# Patient Record
Sex: FEMALE | Race: WHITE | Employment: FULL TIME | URBAN - METROPOLITAN AREA
[De-identification: names, ages, dates, MRNs, and addresses within clinical notes are randomized per-mention and may not be internally consistent; named-entity substitution may affect disease eponyms.]

---

## 2017-02-27 ENCOUNTER — TELEPHONE (OUTPATIENT)
Dept: INTERNAL MEDICINE CLINIC | Age: 22
End: 2017-02-27

## 2017-02-27 NOTE — TELEPHONE ENCOUNTER
Patient stated she went to  on last Wednesday for Chest pain and SOB but they didn't find anything.  told her that she had a panic attack and gave her ativan. Patient stated she went to the ER on Saturday and was told she had Pleurisy. Patient has a lots of questions. Patient wants to know how she got pleurisy. Patient would like to know Dr. RIVERO [facility] recommendation. Patient given an opportunity to ask questions, repeated information, and verbalized understanding.

## 2017-02-27 NOTE — TELEPHONE ENCOUNTER
Pt called and states that she is needing a call back in regards to her diagnosis from the ER on 2/24/17. Please call pt.

## 2017-02-28 NOTE — TELEPHONE ENCOUNTER
Spoke with patient after verifying name and  regarding Dr. David Sweeney recommendations. Writer informed patient of Dr. David Sweeney recommendations. Patient given an opportunity to ask questions, repeated information, and verbalized understanding.

## 2017-03-08 ENCOUNTER — TELEPHONE (OUTPATIENT)
Dept: INTERNAL MEDICINE CLINIC | Age: 22
End: 2017-03-08

## 2017-03-08 NOTE — TELEPHONE ENCOUNTER
Pt called and states that she is needing a call back in regards to getting a medication called in for her coughing up green mucus. Pt states that she was in the ER on 3/3/17. Please call pt.

## 2017-03-30 ENCOUNTER — OFFICE VISIT (OUTPATIENT)
Dept: INTERNAL MEDICINE CLINIC | Age: 22
End: 2017-03-30

## 2017-03-30 VITALS
HEIGHT: 67 IN | OXYGEN SATURATION: 98 % | WEIGHT: 162.4 LBS | TEMPERATURE: 97.7 F | BODY MASS INDEX: 25.49 KG/M2 | HEART RATE: 82 BPM | DIASTOLIC BLOOD PRESSURE: 75 MMHG | SYSTOLIC BLOOD PRESSURE: 108 MMHG | RESPIRATION RATE: 18 BRPM

## 2017-03-30 DIAGNOSIS — J06.9 URI, ACUTE: Primary | ICD-10-CM

## 2017-03-30 RX ORDER — AMOXICILLIN AND CLAVULANATE POTASSIUM 875; 125 MG/1; MG/1
1 TABLET, FILM COATED ORAL 2 TIMES DAILY
Qty: 20 TAB | Refills: 0 | Status: SHIPPED | OUTPATIENT
Start: 2017-03-30 | End: 2017-06-09

## 2017-03-30 NOTE — PROGRESS NOTES
Reviewed record in preparation for visit and have obtained necessary documentation. Identified pt with two pt identifiers(name and ). Chief Complaint   Patient presents with    Cold Symptoms     c/o of cough with green mucus, sneezing, fatigue denies fever or body aches seen in ED for pleurisy on 17           Coordination of Care Questionnaire:  :     1) Have you been to an emergency room, urgent care clinic since your last visit?  ED [facility] 17  Hospitalized since your last visit? no             2) Have you seen or consulted any other health care providers outside of [facility] since your last visit? no  (Include any pap smears or colon screenings in this section.)

## 2017-03-30 NOTE — PROGRESS NOTES
Sheyla Villalta is a [place] y.o. female who complains of being sick for a month. Had pleurisy. Developed URI. Took zpak. Got better. For the past 5 days, nasal congestion, ear pain. No sinus or ear pain  Headaches. Notes discolored mucous with blowing nose and coughing. Notes wheezing and chest congestion. No fever. Some sweats and malaise. Prior bronchitis. Taking mucinex for congestion. Relevant PMH: No pertinent additional PMH. Review of Systems  Pertinent items are noted in HPI. Objective:     Visit Vitals    /75 (BP 1 Location: Left arm, BP Patient Position: Sitting)    Pulse 82    Temp 97.7 °F (36.5 °C) (Oral)    Resp 18    Ht 5' 7.2\" (1.707 m)    Wt 162 lb 6.4 oz (73.7 kg)    LMP 03/30/2017    SpO2 98%    BMI 25.28 kg/m2     Gen: Mildly ill appearing female  HEENT:   PERRL,normal conjunctiva. External ear and canals normal, TMs no opacification or erythema,  Swollen nasal turbinates, no sinus pain on palpation,  OP no erythema, no exudates, MMM  Neck:  Supple. Thyroid normal size, nontender, without nodules. No masses or LAD  Resp:  No wheezing, no rhonchi, no rales. CV:  RRR, normal S1S2, no murmur. Assessment/Plan:       ICD-10-CM ICD-9-CM    1. URI, acute J06.9 465.9 amoxicillin-clavulanate (AUGMENTIN) 875-125 mg per tablet   antibiotics for early sinusitis, recommend mucinex. Follow-up Disposition:  Return if symptoms worsen or fail to improve.     William Lala MD

## 2017-03-30 NOTE — MR AVS SNAPSHOT
Visit Information Date & Time Provider Department Dept. Phone Encounter #  
 3/30/2017 12:00 PM Rolando Salmeron, [address] [place] [address],[address] [phone] [id number] Follow-up Instructions Return if symptoms worsen or fail to improve. Upcoming Health Maintenance Date Due  
 HPV AGE 9Y-34Y (1 of 3 - Female 3 Dose Series) 7/22/2004 DTaP/Tdap/Td series (1 - Tdap) 7/22/2014 INFLUENZA AGE 9 TO ADULT 8/1/2016 PAP AKA CERVICAL CYTOLOGY 12/31/2018 Allergies as of 3/30/2017  Review Complete On: 10/26/2016 By: Rolando Salmeron MD  
  
 Severity Noted Reaction Type Reactions Hydrocodone  12/31/2015    Nausea and Vomiting Current Immunizations  Never Reviewed No immunizations on file. Not reviewed this visit You Were Diagnosed With   
  
 Codes Comments URI, acute    -  Primary ICD-10-CM: J06.9 ICD-9-CM: 465.9 Vitals BP Pulse Temp Resp Height(growth percentile) Weight(growth percentile) 108/75 (BP 1 Location: Left arm, BP Patient Position: Sitting) 82 97.7 °F (36.5 °C) (Oral) 18 5' 7.2\" (1.707 m) 162 lb 6.4 oz (73.7 kg) LMP SpO2 BMI OB Status Smoking Status 03/30/2017 98% 25.28 kg/m2 Having regular periods Never Smoker Vitals History BMI and BSA Data Body Mass Index Body Surface Area  
 25.28 kg/m 2 1.87 m 2 Preferred Pharmacy Pharmacy Name Phone [id number]- [facility] [address] [facility] 1 [phone] Your Updated Medication List  
  
   
This list is accurate as of: 3/30/17 12:49 PM.  Always use your most recent med list.  
  
  
  
  
 amoxicillin-clavulanate 875-125 mg per tablet Commonly known as:  AUGMENTIN Take 1 Tab by mouth two (2) times a day. escitalopram oxalate 10 mg tablet Commonly known as:  Arther Putty Take 1 Tab by mouth daily. multivitamin tablet Commonly known as:  ONE A DAY Take 1 Tab by mouth daily. norelgestromin-ethinyl estradiol 150-35 mcg/24 hr  
Commonly known as:  ORTHO EVRA  
1 Patch by TransDERmal route Every Saturday. SUMAtriptan 100 mg tablet Commonly known as:  IMITREX Take 1/2-1 tablets at onset of headache, repeat at 2 hours if needed, max 200mg in 24 hours. Prescriptions Sent to Pharmacy Refills  
 amoxicillin-clavulanate (AUGMENTIN) 875-125 mg per tablet 0 Sig: Take 1 Tab by mouth two (2) times a day. Class: Normal  
 Pharmacy: [address] [place] [address] 1  #: [phone] Route: Oral  
  
Follow-up Instructions Return if symptoms worsen or fail to improve. Introducing \[facility]\"" & HEALTH SERVICES! Alejandra Ashley introduces [employer] patient portal. Now you can access parts of your medical record, email your doctor's office, and request medication refills online. 1. In your internet browser, go to [email or web address] 2. Click on the First Time User? Click Here link in the Sign In box. You will see the New Member Sign Up page. 3. Enter your [employer] Access Code exactly as it appears below. You will not need to use this code after youve completed the sign-up process. If you do not sign up before the expiration date, you must request a new code. · [employer] Access Code: [email or web address] Expires: 6/28/2017 12:49 PM 
 
4. Enter the last four digits of your Social Security Number (xxxx) and Date of Birth (mm/dd/yyyy) as indicated and click Submit. You will be taken to the next sign-up page. 5. Create a [employer] ID. This will be your [employer] login ID and cannot be changed, so think of one that is secure and easy to remember. 6. Create a [employer] password. You can change your password at any time. 7. Enter your Password Reset Question and Answer. This can be used at a later time if you forget your password. 8. Enter your e-mail address.  You will receive e-mail notification when new information is available in [employer]. 9. Click Sign Up. You can now view and download portions of your medical record. 10. Click the Download Summary menu link to download a portable copy of your medical information. If you have questions, please visit the Frequently Asked Questions section of the [employer] website. Remember, [employer] is NOT to be used for urgent needs. For medical emergencies, dial 911. Now available from your iPhone and Android! Please provide this summary of care documentation to your next provider. Your primary care clinician is listed as Owen Marie. If you have any questions after today's visit, please call [phone].

## 2017-06-09 ENCOUNTER — OFFICE VISIT (OUTPATIENT)
Dept: INTERNAL MEDICINE CLINIC | Age: 22
End: 2017-06-09

## 2017-06-09 VITALS
RESPIRATION RATE: 16 BRPM | TEMPERATURE: 98.5 F | SYSTOLIC BLOOD PRESSURE: 104 MMHG | BODY MASS INDEX: 25.46 KG/M2 | HEIGHT: 67 IN | WEIGHT: 162.2 LBS | OXYGEN SATURATION: 96 % | DIASTOLIC BLOOD PRESSURE: 70 MMHG | HEART RATE: 85 BPM

## 2017-06-09 DIAGNOSIS — Z12.4 SCREENING FOR CERVICAL CANCER: Primary | ICD-10-CM

## 2017-06-09 DIAGNOSIS — F41.9 ANXIETY: ICD-10-CM

## 2017-06-09 RX ORDER — ESCITALOPRAM OXALATE 10 MG/1
10 TABLET ORAL DAILY
Qty: 90 TAB | Refills: 3 | Status: SHIPPED | OUTPATIENT
Start: 2017-06-09 | End: 2020-01-24

## 2017-06-09 NOTE — PROGRESS NOTES
Phillip Hassan is a 19 y.o. female    Chief Complaint   Patient presents with    Well Woman     W/h pap    Medication Refill     Lexapro     1. Have you been to the ER, urgent care clinic since your last visit? Hospitalized since your last visit? No    2. Have you seen or consulted any other health care providers outside of the [address] [place] [address] since your last visit? Include any pap smears or colon screening.  No

## 2017-06-09 NOTE — MR AVS SNAPSHOT
Visit Information Date & Time Provider Department Dept. Phone Encounter #  
 6/9/2017  9:30 AM Yaquelin Del Valle, [address] [id number] Follow-up Instructions Return for follow up annual and as needed. Khanh Carrero Upcoming Health Maintenance Date Due  
 HPV AGE 9Y-34Y (1 of 3 - Female 3 Dose Series) 7/22/2004 DTaP/Tdap/Td series (1 - Tdap) 7/22/2014 INFLUENZA AGE 9 TO ADULT 8/1/2017 PAP AKA CERVICAL CYTOLOGY 12/31/2018 Allergies as of 6/9/2017  Review Complete On: 6/9/2017 By: Yaquelin Del Valle MD  
  
 Severity Noted Reaction Type Reactions Hydrocodone  12/31/2015    Nausea and Vomiting Current Immunizations  Never Reviewed No immunizations on file. Not reviewed this visit You Were Diagnosed With   
  
 Codes Comments Screening for cervical cancer    -  Primary ICD-10-CM: Z12.4 ICD-9-CM: V76.2 Anxiety     ICD-10-CM: F41.9 ICD-9-CM: 300.00 Vitals BP Pulse Temp Resp Height(growth percentile) Weight(growth percentile) 104/70 (BP 1 Location: Left arm, BP Patient Position: Sitting) 85 98.5 °F (36.9 °C) (Oral) 16 5' 7\" (1.702 m) 162 lb 3.2 oz (73.6 kg) LMP SpO2 BMI OB Status Smoking Status 05/26/2017 (Approximate) 96% 25.4 kg/m2 Having regular periods Never Smoker Vitals History BMI and BSA Data Body Mass Index Body Surface Area  
 25.4 kg/m 2 1.87 m 2 Preferred Pharmacy Pharmacy Name Phone [id number] [place], [address] [facility] 2 [phone] Your Updated Medication List  
  
   
This list is accurate as of: 6/9/17 10:17 AM.  Always use your most recent med list.  
  
  
  
  
 escitalopram oxalate 10 mg tablet Commonly known as:  Aliene Apo Take 1 Tab by mouth daily. multivitamin tablet Commonly known as:  ONE A DAY Take 1 Tab by mouth daily.   
  
 norelgestromin-ethinyl estradiol 150-35 mcg/24 hr  
 Commonly known as:  ORTHO EVRA  
1 Patch by TransDERmal route Every Saturday. SUMAtriptan 100 mg tablet Commonly known as:  IMITREX Take 1/2-1 tablets at onset of headache, repeat at 2 hours if needed, max 200mg in 24 hours. Prescriptions Sent to Pharmacy Refills  
 escitalopram oxalate (LEXAPRO) 10 mg tablet 3 Sig: Take 1 Tab by mouth daily. Class: Normal  
 Pharmacy: [address] [place] [address] 1  #: [phone] Route: Oral  
  
We Performed the Following PAP (IMAGE GUIDED) W/REFL HPV ALL PATHOLOGY ([id number]) [TSK9600 Custom] Follow-up Instructions Return for follow up annual and as needed. .  
  
  
Introducing [facility] & HEALTH SERVICES! Skip Marquez introduces [employer] patient portal. Now you can access parts of your medical record, email your doctor's office, and request medication refills online. 1. In your internet browser, go to [email or web address] 2. Click on the First Time User? Click Here link in the Sign In box. You will see the New Member Sign Up page. 3. Enter your [employer] Access Code exactly as it appears below. You will not need to use this code after youve completed the sign-up process. If you do not sign up before the expiration date, you must request a new code. · [employer] Access Code: [email or web address] Expires: 6/28/2017 12:49 PM 
 
4. Enter the last four digits of your Social Security Number (xxxx) and Date of Birth (mm/dd/yyyy) as indicated and click Submit. You will be taken to the next sign-up page. 5. Create a [employer] ID. This will be your [employer] login ID and cannot be changed, so think of one that is secure and easy to remember. 6. Create a [employer] password. You can change your password at any time. 7. Enter your Password Reset Question and Answer. This can be used at a later time if you forget your password. 8. Enter your e-mail address. You will receive e-mail notification when new information is available in [address] 9. Click Sign Up. You can now view and download portions of your medical record. 10. Click the Download Summary menu link to download a portable copy of your medical information. If you have questions, please visit the Frequently Asked Questions section of the [employer] website. Remember, [employer] is NOT to be used for urgent needs. For medical emergencies, dial 911. Now available from your iPhone and Android! Please provide this summary of care documentation to your next provider. Your primary care clinician is listed as Dinesh Echevarria. If you have any questions after today's visit, please call [phone].

## 2017-06-09 NOTE — PROGRESS NOTES
She is a 19 y.o. female who presents for well woman exam.      Last pap in January 2016 was normal and negative for HPV. Had abnormal pap in 2015. Diagnosed HPV positive by pap in May 2015 with last PCP. Is on Ortho Evra patch. Sexually active, Monogamous. No vaginal discharge or lesions. No urinary sx. Had Gardisil series. Taking lexapro for mood. Anxiety better controlled. Taking imitrex as needed for migraines with good efficacy    Due for eye exam.  Up to date on dental.       ROS:  Constitutional: negative for fevers, chills, anorexia and weight loss  Eyes:   negative for visual disturbance and irritation  ENT:   negative for tinnitus,sore throat,nasal congestion,ear pain. Respiratory:  negative for cough, hemoptysis, dyspnea,wheezing  CV:   negative for chest pain, palpitations, lower extremity edema  GI:   negative for nausea, vomiting, diarrhea, abdominal pain,melena  Genitourinary: negative for frequency, dysuria and hematuria, vaginal discharge, lesions  Musculoskel: negative for myalgias, arthralgias, back pain, muscle weakness, joint pain  Neurological:  negative for headaches, dizziness, focal weakness, numbness  Psych:             Positive for anxiety      Past Medical History:   Diagnosis Date    HPV test positive     Lyme disease 2009    no residual symptoms       History reviewed. No pertinent surgical history. Family History   Problem Relation Age of Onset    Diabetes Mother     Hypertension Mother     High Cholesterol Father        Social History     Social History    Marital status: SINGLE     Spouse name: N/A    Number of children: N/A    Years of education: N/A     Occupational History    Not on file.      Social History Main Topics    Smoking status: Never Smoker    Smokeless tobacco: Not on file    Alcohol use Yes    Drug use: No    Sexual activity: Yes     Partners: Male     Other Topics Concern    Not on file     Social History Narrative            Visit Vitals    /70 (BP 1 Location: Left arm, BP Patient Position: Sitting)    Pulse 85    Temp 98.5 °F (36.9 °C) (Oral)    Resp 16    Ht 5' 7\" (1.702 m)    Wt 162 lb 3.2 oz (73.6 kg)    LMP 05/26/2017 (Approximate)    SpO2 96%    BMI 25.4 kg/m2       Physical Examination:   General - Well appearing female  HEENT - unremarkable  Neck - supple, no bruits, no TMG, no LAD  Pulm - clear to auscultation bilaterally  Cardio - RRR, normal S1 S2, no murmur  Abd - soft, nontender, no masses, no HSM  Extrem - no edema, +2 distal pulses  Breasts- normal nipples without discharge or rash, no masses, no axillary LAD  Pelvic- normal external genitalia, speculum exam with normal appearing cervix, pap   done, bimanual exam with no CMT, adnexal tenderness or masses      Assessement and Plan:     1. Screening for cervical cancer    - PAP (IMAGE GUIDED) W/REFL HPV ALL PATHOLOGY ([id number])    2. Anxiety    - escitalopram oxalate (LEXAPRO) 10 mg tablet; Take 1 Tab by mouth daily. Dispense: 90 Tab; Refill: 3     Follow-up Disposition:  Return for follow up annual and as needed.   Izabela Finley MD

## 2017-06-22 ENCOUNTER — TELEPHONE (OUTPATIENT)
Dept: INTERNAL MEDICINE CLINIC | Age: 22
End: 2017-06-22

## 2017-06-22 NOTE — TELEPHONE ENCOUNTER
Returned call to patient. Left detailed message on patient's personal voicemail advising pap results not back yet. Will call when results available.

## 2017-06-29 ENCOUNTER — TELEPHONE (OUTPATIENT)
Dept: INTERNAL MEDICINE CLINIC | Age: 22
End: 2017-06-29

## 2017-06-29 NOTE — TELEPHONE ENCOUNTER
Patient states she would like a call back between 1-1:30pm to get her Pap Smear results. Please call.  Thank you

## 2017-06-29 NOTE — TELEPHONE ENCOUNTER
Returned call and left message for patient stating we were calling [facility] now to track down results. Will call back ASAP with results.

## 2017-07-26 NOTE — TELEPHONE ENCOUNTER
Spoke with Radha Sahu at [facility]. They do not have patient's pap results. Patient has [employer]. Therefore, her results are through [employer].

## 2017-07-26 NOTE — TELEPHONE ENCOUNTER
Patient states she needs a call back today in reference to test/lab results that patient states she's still waiting to hear about from June. Please call to advise.  Thank you

## 2017-07-26 NOTE — TELEPHONE ENCOUNTER
Results received. Called patient and advised of normal pap. Negative for intraepithelial lesion or malignancy. Patient verbalized understanding. Patient reported hx of abnml pap about 2 years ago. Normal paps since. Based on current ACOG guidelines, advised patient she could repeat pap in 3 years. She opts to have pap annually. All questions answered today. Also apologized for delay in results. Patient appreciative.

## 2017-07-26 NOTE — TELEPHONE ENCOUNTER
Spoke with Giuseppe Oliver at [facility]. She has patient's pap results and is faxing them to me directly at [phone].

## 2017-09-25 DIAGNOSIS — G43.909 MIGRAINE WITHOUT STATUS MIGRAINOSUS, NOT INTRACTABLE, UNSPECIFIED MIGRAINE TYPE: ICD-10-CM

## 2017-09-26 RX ORDER — SUMATRIPTAN 100 MG/1
TABLET, FILM COATED ORAL
Qty: 9 TAB | Refills: 5 | Status: SHIPPED | OUTPATIENT
Start: 2017-09-26 | End: 2021-01-08 | Stop reason: SDUPTHER

## 2017-12-19 RX ORDER — NORELGESTROMIN AND ETHINYL ESTRADIOL 150; 35 UG/D; UG/D
PATCH TRANSDERMAL
Qty: 12 PATCH | Refills: 0 | Status: SHIPPED | OUTPATIENT
Start: 2017-12-19 | End: 2018-03-17 | Stop reason: SDUPTHER

## 2018-03-18 RX ORDER — NORELGESTROMIN AND ETHINYL ESTRADIOL 150; 35 UG/D; UG/D
PATCH TRANSDERMAL
Qty: 12 PATCH | Refills: 0 | Status: SHIPPED | OUTPATIENT
Start: 2018-03-18 | End: 2018-07-03 | Stop reason: SDUPTHER

## 2018-07-03 RX ORDER — NORELGESTROMIN AND ETHINYL ESTRADIOL 150; 35 UG/D; UG/D
PATCH TRANSDERMAL
Qty: 12 PATCH | Refills: 0 | Status: SHIPPED | OUTPATIENT
Start: 2018-07-03 | End: 2020-01-24 | Stop reason: ALTCHOICE

## 2018-07-09 ENCOUNTER — HOSPITAL ENCOUNTER (EMERGENCY)
Age: 23
Discharge: HOME | End: 2018-07-09
Attending: EMERGENCY MEDICINE
Payer: COMMERCIAL

## 2018-07-09 VITALS
TEMPERATURE: 99.1 F | DIASTOLIC BLOOD PRESSURE: 54 MMHG | RESPIRATION RATE: 15 BRPM | HEIGHT: 67 IN | HEART RATE: 99 BPM | OXYGEN SATURATION: 99 % | WEIGHT: 160 LBS | BODY MASS INDEX: 25.11 KG/M2 | SYSTOLIC BLOOD PRESSURE: 116 MMHG

## 2018-07-09 DIAGNOSIS — K52.9 GASTROENTERITIS, ACUTE: Primary | ICD-10-CM

## 2018-07-09 LAB
ALBUMIN SERPL-MCNC: 4 G/DL (ref 3.5–5)
ALBUMIN/GLOB SERPL: 0.9 {RATIO} (ref 1.1–2.2)
ALP SERPL-CCNC: 76 U/L (ref 45–117)
ALT SERPL-CCNC: 21 U/L (ref 12–78)
ANION GAP SERPL CALC-SCNC: 12 MMOL/L (ref 5–15)
APPEARANCE UR: CLEAR
AST SERPL-CCNC: 16 U/L (ref 15–37)
BACTERIA URNS QL MICRO: NEGATIVE /HPF
BILIRUB SERPL-MCNC: 0.4 MG/DL (ref 0.2–1)
BILIRUB UR QL: NEGATIVE
BUN SERPL-MCNC: 15 MG/DL (ref 6–20)
BUN/CREAT SERPL: 17 (ref 12–20)
CALCIUM SERPL-MCNC: 9.2 MG/DL (ref 8.5–10.1)
CHLORIDE SERPL-SCNC: 105 MMOL/L (ref 97–108)
CO2 SERPL-SCNC: 22 MMOL/L (ref 21–32)
COLOR UR: ABNORMAL
CREAT SERPL-MCNC: 0.9 MG/DL (ref 0.55–1.02)
EPITH CASTS URNS QL MICRO: ABNORMAL /LPF
ERYTHROCYTE [DISTWIDTH] IN BLOOD BY AUTOMATED COUNT: 12.1 % (ref 11.5–14.5)
GLOBULIN SER CALC-MCNC: 4.4 G/DL (ref 2–4)
GLUCOSE SERPL-MCNC: 126 MG/DL (ref 65–100)
GLUCOSE UR STRIP.AUTO-MCNC: NEGATIVE MG/DL
HCG UR QL: NEGATIVE
HCT VFR BLD AUTO: 45 % (ref 35–47)
HGB BLD-MCNC: 15.4 G/DL (ref 11.5–16)
HGB UR QL STRIP: NEGATIVE
HYALINE CASTS URNS QL MICRO: ABNORMAL /LPF (ref 0–5)
KETONES UR QL STRIP.AUTO: NEGATIVE MG/DL
LEUKOCYTE ESTERASE UR QL STRIP.AUTO: NEGATIVE
MCH RBC QN AUTO: 31.1 PG (ref 26–34)
MCHC RBC AUTO-ENTMCNC: 34.2 G/DL (ref 30–36.5)
MCV RBC AUTO: 90.9 FL (ref 80–99)
NITRITE UR QL STRIP.AUTO: NEGATIVE
NRBC # BLD: 0 K/UL (ref 0–0.01)
NRBC BLD-RTO: 0 PER 100 WBC
PH UR STRIP: 7 [PH] (ref 5–8)
PLATELET # BLD AUTO: 326 K/UL (ref 150–400)
PMV BLD AUTO: 9.3 FL (ref 8.9–12.9)
POTASSIUM SERPL-SCNC: 4.1 MMOL/L (ref 3.5–5.1)
PROT SERPL-MCNC: 8.4 G/DL (ref 6.4–8.2)
PROT UR STRIP-MCNC: 30 MG/DL
RBC # BLD AUTO: 4.95 M/UL (ref 3.8–5.2)
RBC #/AREA URNS HPF: ABNORMAL /HPF (ref 0–5)
SODIUM SERPL-SCNC: 139 MMOL/L (ref 136–145)
SP GR UR REFRACTOMETRY: 1.02 (ref 1–1.03)
UR CULT HOLD, URHOLD: NORMAL
UROBILINOGEN UR QL STRIP.AUTO: 0.2 EU/DL (ref 0.2–1)
WBC # BLD AUTO: 14.9 K/UL (ref 3.6–11)
WBC URNS QL MICRO: ABNORMAL /HPF (ref 0–4)

## 2018-07-09 PROCEDURE — 81001 URINALYSIS AUTO W/SCOPE: CPT | Performed by: EMERGENCY MEDICINE

## 2018-07-09 PROCEDURE — 99284 EMERGENCY DEPT VISIT MOD MDM: CPT

## 2018-07-09 PROCEDURE — 36415 COLL VENOUS BLD VENIPUNCTURE: CPT | Performed by: EMERGENCY MEDICINE

## 2018-07-09 PROCEDURE — 80053 COMPREHEN METABOLIC PANEL: CPT | Performed by: EMERGENCY MEDICINE

## 2018-07-09 PROCEDURE — 96374 THER/PROPH/DIAG INJ IV PUSH: CPT

## 2018-07-09 PROCEDURE — 85027 COMPLETE CBC AUTOMATED: CPT | Performed by: EMERGENCY MEDICINE

## 2018-07-09 PROCEDURE — 74011250636 HC RX REV CODE- 250/636

## 2018-07-09 PROCEDURE — 96361 HYDRATE IV INFUSION ADD-ON: CPT

## 2018-07-09 PROCEDURE — 81025 URINE PREGNANCY TEST: CPT

## 2018-07-09 PROCEDURE — 74011250636 HC RX REV CODE- 250/636: Performed by: EMERGENCY MEDICINE

## 2018-07-09 RX ORDER — ONDANSETRON 8 MG/1
8 TABLET, ORALLY DISINTEGRATING ORAL
Qty: 6 TAB | Refills: 0 | Status: SHIPPED | OUTPATIENT
Start: 2018-07-09 | End: 2020-01-24

## 2018-07-09 RX ORDER — ONDANSETRON 2 MG/ML
8 INJECTION INTRAMUSCULAR; INTRAVENOUS
Status: COMPLETED | OUTPATIENT
Start: 2018-07-09 | End: 2018-07-09

## 2018-07-09 RX ORDER — ONDANSETRON 2 MG/ML
INJECTION INTRAMUSCULAR; INTRAVENOUS
Status: COMPLETED
Start: 2018-07-09 | End: 2018-07-09

## 2018-07-09 RX ORDER — ONDANSETRON 8 MG/1
8 TABLET, ORALLY DISINTEGRATING ORAL
Qty: 6 TAB | Refills: 0 | Status: SHIPPED | OUTPATIENT
Start: 2018-07-09 | End: 2018-07-09

## 2018-07-09 RX ADMIN — ONDANSETRON 8 MG: 2 INJECTION INTRAMUSCULAR; INTRAVENOUS at 05:27

## 2018-07-09 RX ADMIN — SODIUM CHLORIDE 1000 ML: 900 INJECTION, SOLUTION INTRAVENOUS at 05:28

## 2018-07-09 NOTE — Clinical Note
I suspect you have a stomach virus given the onset of nausea, vomiting and diarrhea. Drink plenty of fluids today to stay well hydrated. You can use the Zofran prescribed every 6-8 hours as needed. If you develop increased abdominal pain, fever, recur rent vomiting, return here.

## 2018-07-09 NOTE — DISCHARGE INSTRUCTIONS
Gastroenteritis: Care Instructions  Your Care Instructions    Gastroenteritis is an illness that may cause nausea, vomiting, and diarrhea. It is sometimes called \"stomach flu. \" It can be caused by bacteria or a virus. You will probably begin to feel better in 1 to 2 days. In the meantime, get plenty of rest and make sure you do not become dehydrated. Dehydration occurs when your body loses too much fluid. Follow-up care is a key part of your treatment and safety. Be sure to make and go to all appointments, and call your doctor if you are having problems. It's also a good idea to know your test results and keep a list of the medicines you take. How can you care for yourself at home? · If your doctor prescribed antibiotics, take them as directed. Do not stop taking them just because you feel better. You need to take the full course of antibiotics. · Drink plenty of fluids to prevent dehydration, enough so that your urine is light yellow or clear like water. Choose water and other caffeine-free clear liquids until you feel better. If you have kidney, heart, or liver disease and have to limit fluids, talk with your doctor before you increase your fluid intake. · Drink fluids slowly, in frequent, small amounts, because drinking too much too fast can cause vomiting. · Begin eating mild foods, such as dry toast, yogurt, applesauce, bananas, and rice. Avoid spicy, hot, or high-fat foods, and do not drink alcohol or caffeine for a day or two. Do not drink milk or eat ice cream until you are feeling better. How to prevent gastroenteritis  · Keep hot foods hot and cold foods cold. · Do not eat meats, dressings, salads, or other foods that have been kept at room temperature for more than 2 hours. · Use a thermometer to check your refrigerator. It should be between 34°F and 40°F.  · Defrost meats in the refrigerator or microwave, not on the kitchen counter. · Keep your hands and your kitchen clean.  Wash your hands, cutting boards, and countertops with hot soapy water frequently. · Cook meat until it is well done. · Do not eat raw eggs or uncooked sauces made with raw eggs. · Do not take chances. If food looks or tastes spoiled, throw it out. When should you call for help? Call 911 anytime you think you may need emergency care. For example, call if:  ? · You vomit blood or what looks like coffee grounds. ? · You passed out (lost consciousness). ? · You pass maroon or very bloody stools. ?Call your doctor now or seek immediate medical care if:  ? · You have severe belly pain. ? · You have signs of needing more fluids. You have sunken eyes, a dry mouth, and pass only a little dark urine. ? · You feel like you are going to faint. ? · You have increased belly pain that does not go away in 1 to 2 days. ? · You have new or increased nausea, or you are vomiting. ? · You have a new or higher fever. ? · Your stools are black and tarlike or have streaks of blood. ? Watch closely for changes in your health, and be sure to contact your doctor if:  ? · You are dizzy or lightheaded. ? · You urinate less than usual, or your urine is dark yellow or brown. ? · You do not feel better with each day that goes by. Where can you learn more? Go to http://olga-kendra.info/. Enter N142 in the search box to learn more about \"Gastroenteritis: Care Instructions. \"  Current as of: March 3, 2017  Content Version: 11.4  © 9047-9990 [employer]. Care instructions adapted under license by [employer] (which disclaims liability or warranty for this information). If you have questions about a medical condition or this instruction, always ask your healthcare professional. [address] any warranty or liability for your use of this information.

## 2018-07-09 NOTE — ED TRIAGE NOTES
Triage:  Pt comes in ambulatory from home complaining of N/V/D that began Saturday. Pt reports inability to \"keep anything down\" and abdominal cramping. Pt reports chills and sweating as well but no fever.

## 2018-07-09 NOTE — ED PROVIDER NOTES
Patient is a 23 y.o. female presenting with vomiting. Vomiting    The problem occurs 2 to 4 times per day. Associated symptoms include abdominal pain and diarrhea. Pertinent negatives include no fever, no headaches, no cough and no headaches. 23year old female with no significant pmhx, HPV/lyme disease, presents with acute onset of n/v/d since 7pm last night. Cramping abdominal pain. No fevers. No blood in stool or vomit. Feels lightheaded when standing, decreased urine. Feels dehydrated. No recent antibiotic use. Did just travel back form [place] last week. No known bad food exposure, camping. Noone else is sick at home. NO history of any gi issues, no surgical history. Denies pregnancy, last period ended 3 days ago. Past Medical History:   Diagnosis Date    HPV test positive     Lyme disease 2009    no residual symptoms       No past surgical history on file. Family History:   Problem Relation Age of Onset    Diabetes Mother     Hypertension Mother     High Cholesterol Father        Social History     Social History    Marital status: SINGLE     Spouse name: N/A    Number of children: N/A    Years of education: N/A     Occupational History    Not on file. Social History Main Topics    Smoking status: Never Smoker    Smokeless tobacco: Not on file    Alcohol use Yes    Drug use: No    Sexual activity: Yes     Partners: Male     Other Topics Concern    Not on file     Social History Narrative         ALLERGIES: Hydrocodone    Review of Systems   Constitutional: Negative for fever. HENT: Positive for congestion and sore throat. Eyes: Negative for visual disturbance. Respiratory: Negative for cough and shortness of breath. Cardiovascular: Negative for chest pain. Gastrointestinal: Positive for abdominal pain, diarrhea, nausea and vomiting. Negative for blood in stool. Endocrine: Negative for polyuria. Genitourinary: Positive for decreased urine volume.  Negative for dysuria. Musculoskeletal: Negative for joint swelling. Neurological: Positive for light-headedness. Negative for headaches. Psychiatric/Behavioral: Negative for dysphoric mood. Vitals:    07/09/18 0453 07/09/18 0455   BP: 113/67    Pulse: (!) 117    Resp: 16    Temp: 98.1 °F (36.7 °C)    Weight:  72.6 kg (160 lb)   Height:  5' 7\" (1.702 m)            Physical Exam   Constitutional: She is oriented to person, place, and time. She appears well-developed and well-nourished. No distress. HENT:   Head: Normocephalic and atraumatic. Mouth/Throat: Oropharynx is clear and moist. No oropharyngeal exudate. Eyes: Conjunctivae and EOM are normal. Pupils are equal, round, and reactive to light. Right eye exhibits no discharge. Left eye exhibits no discharge. No scleral icterus. Neck: Normal range of motion. Neck supple. No JVD present. Cardiovascular: Normal rate, regular rhythm, normal heart sounds and intact distal pulses. Exam reveals no gallop and no friction rub. No murmur heard. Pulmonary/Chest: Effort normal and breath sounds normal. No stridor. No respiratory distress. She has no wheezes. She has no rales. She exhibits no tenderness. Abdominal: Soft. Bowel sounds are normal. She exhibits no distension and no mass. There is tenderness (LUQ/LLQ mild, no rebound or guarding). There is no rebound and no guarding. Musculoskeletal: Normal range of motion. She exhibits no edema or tenderness. Neurological: She is alert and oriented to person, place, and time. She has normal reflexes. No cranial nerve deficit. She exhibits normal muscle tone. Coordination normal.   Skin: Skin is warm and dry. No rash noted. No erythema. Psychiatric: She has a normal mood and affect. Her behavior is normal. Judgment and thought content normal.        MDM      ED Course       Procedures         WBC 14.9, electrolyte WNL. Feeling much better with fluids. Abdominal exam is benign, specifically no RLQ pain.   With acute onset of n/v and diarrhea, likely GI virus with stress demargination. NO fever. Patient thristy and wants to try po's. Will challenge. If passes, anticipate d/c with Zofran, oral hydration at home. If develops fever, RLQ pain, etc. Return here. Abhinav Orourke MD  7:09 AM  Tolerating po's, feels much better and ready to go home. Discussed WBC elevation with patient and spouse. They will monitor at home. If develops localized pain, fever, recurrent vomiting will return.

## 2020-01-24 ENCOUNTER — OFFICE VISIT (OUTPATIENT)
Dept: FAMILY MEDICINE CLINIC | Age: 25
End: 2020-01-24

## 2020-01-24 VITALS
HEART RATE: 82 BPM | HEIGHT: 67 IN | DIASTOLIC BLOOD PRESSURE: 79 MMHG | RESPIRATION RATE: 16 BRPM | BODY MASS INDEX: 24.8 KG/M2 | TEMPERATURE: 98.2 F | SYSTOLIC BLOOD PRESSURE: 117 MMHG | WEIGHT: 158 LBS | OXYGEN SATURATION: 100 %

## 2020-01-24 DIAGNOSIS — F41.1 GAD (GENERALIZED ANXIETY DISORDER): ICD-10-CM

## 2020-01-24 DIAGNOSIS — Z83.49 FAMILY HISTORY OF VITAMIN B12 DEFICIENCY: ICD-10-CM

## 2020-01-24 DIAGNOSIS — Z00.00 ROUTINE ADULT HEALTH MAINTENANCE: Primary | ICD-10-CM

## 2020-01-24 RX ORDER — ESCITALOPRAM OXALATE 10 MG/1
10 TABLET ORAL DAILY
Qty: 90 TAB | Refills: 1 | Status: SHIPPED | OUTPATIENT
Start: 2020-01-24 | End: 2022-05-13 | Stop reason: ALTCHOICE

## 2020-01-24 RX ORDER — NORETHINDRONE ACETATE AND ETHINYL ESTRADIOL, ETHINYL ESTRADIOL AND FERROUS FUMARATE 1MG-10(24)
KIT ORAL
COMMUNITY
Start: 2019-11-15 | End: 2022-05-13 | Stop reason: ALTCHOICE

## 2020-01-24 NOTE — PATIENT INSTRUCTIONS
Anxiety Disorder: Care Instructions  Your Care Instructions    Anxiety is a normal reaction to stress. Difficult situations can cause you to have symptoms such as sweaty palms and a nervous feeling. In an anxiety disorder, the symptoms are far more severe. Constant worry, muscle tension, trouble sleeping, nausea and diarrhea, and other symptoms can make normal daily activities difficult or impossible. These symptoms may occur for no reason, and they can affect your work, school, or social life. Medicines, counseling, and self-care can all help. Follow-up care is a key part of your treatment and safety. Be sure to make and go to all appointments, and call your doctor if you are having problems. It's also a good idea to know your test results and keep a list of the medicines you take. How can you care for yourself at home? · Take medicines exactly as directed. Call your doctor if you think you are having a problem with your medicine. · Go to your counseling sessions and follow-up appointments. · Recognize and accept your anxiety. Then, when you are in a situation that makes you anxious, say to yourself, \"This is not an emergency. I feel uncomfortable, but I am not in danger. I can keep going even if I feel anxious. \"  · Be kind to your body:  ? Relieve tension with exercise or a massage. ? Get enough rest.  ? Avoid alcohol, caffeine, nicotine, and illegal drugs. They can increase your anxiety level and cause sleep problems. ? Learn and do relaxation techniques. See below for more about these techniques. · Engage your mind. Get out and do something you enjoy. Go to a funny movie, or take a walk or hike. Plan your day. Having too much or too little to do can make you anxious. · Keep a record of your symptoms. Discuss your fears with a good friend or family member, or join a support group for people with similar problems. Talking to others sometimes relieves stress.   · Get involved in social groups, or volunteer to help others. Being alone sometimes makes things seem worse than they are. · Get at least 30 minutes of exercise on most days of the week to relieve stress. Walking is a good choice. You also may want to do other activities, such as running, swimming, cycling, or playing tennis or team sports. Relaxation techniques  Do relaxation exercises 10 to 20 minutes a day. You can play soothing, relaxing music while you do them, if you wish. · Tell others in your house that you are going to do your relaxation exercises. Ask them not to disturb you. · Find a comfortable place, away from all distractions and noise. · Lie down on your back, or sit with your back straight. · Focus on your breathing. Make it slow and steady. · Breathe in through your nose. Breathe out through either your nose or mouth. · Breathe deeply, filling up the area between your navel and your rib cage. Breathe so that your belly goes up and down. · Do not hold your breath. · Breathe like this for 5 to 10 minutes. Notice the feeling of calmness throughout your whole body. As you continue to breathe slowly and deeply, relax by doing the following for another 5 to 10 minutes:  · Tighten and relax each muscle group in your body. You can begin at your toes and work your way up to your head. · Imagine your muscle groups relaxing and becoming heavy. · Empty your mind of all thoughts. · Let yourself relax more and more deeply. · Become aware of the state of calmness that surrounds you. · When your relaxation time is over, you can bring yourself back to alertness by moving your fingers and toes and then your hands and feet and then stretching and moving your entire body. Sometimes people fall asleep during relaxation, but they usually wake up shortly afterward. · Always give yourself time to return to full alertness before you drive a car or do anything that might cause an accident if you are not fully alert.  Never play a relaxation tape while you drive a car. When should you call for help? Call 911 anytime you think you may need emergency care. For example, call if:    · You feel you cannot stop from hurting yourself or someone else.   Edmund Rosario the numbers for these national suicide hotlines: 0-220-795-TALK (0-449-036-554.427.3951) and 1-062-INLHZYN (4-874.873.6072). If you or someone you know talks about suicide or feeling hopeless, get help right away.   Watch closely for changes in your health, and be sure to contact your doctor if:    · You have anxiety or fear that affects your life.     · You have symptoms of anxiety that are new or different from those you had before. Where can you learn more? Go to http://olga-kendra.info/. Enter P754 in the search box to learn more about \"Anxiety Disorder: Care Instructions. \"  Current as of: May 28, 2019  Content Version: 12.2  © 1901-0195 [employer]. Care instructions adapted under license by [employer] (which disclaims liability or warranty for this information). If you have questions about a medical condition or this instruction, always ask your healthcare professional. [place] [address] any warranty or liability for your use of this information. Well Visit, Ages 25 to 48: Care Instructions  Your Care Instructions    Physical exams can help you stay healthy. Your doctor has checked your overall health and may have suggested ways to take good care of yourself. He or she also may have recommended tests. At home, you can help prevent illness with healthy eating, regular exercise, and other steps. Follow-up care is a key part of your treatment and safety. Be sure to make and go to all appointments, and call your doctor if you are having problems. It's also a good idea to know your test results and keep a list of the medicines you take. How can you care for yourself at home? · Reach and stay at a healthy weight.  This will lower your risk for many problems, such as obesity, diabetes, heart disease, and high blood pressure. · Get at least 30 minutes of physical activity on most days of the week. Walking is a good choice. You also may want to do other activities, such as running, swimming, cycling, or playing tennis or team sports. Discuss any changes in your exercise program with your doctor. · Do not smoke or allow others to smoke around you. If you need help quitting, talk to your doctor about stop-smoking programs and medicines. These can increase your chances of quitting for good. · Talk to your doctor about whether you have any risk factors for sexually transmitted infections (STIs). Having one sex partner (who does not have STIs and does not have sex with anyone else) is a good way to avoid these infections. · Use birth control if you do not want to have children at this time. Talk with your doctor about the choices available and what might be best for you. · Protect your skin from too much sun. When you're outdoors from 10 a.m. to 4 p.m., stay in the shade or cover up with clothing and a hat with a wide brim. Wear sunglasses that block UV rays. Even when it's cloudy, put broad-spectrum sunscreen (SPF 30 or higher) on any exposed skin. · See a dentist one or two times a year for checkups and to have your teeth cleaned. · Wear a seat belt in the car. Follow your doctor's advice about when to have certain tests. These tests can spot problems early. For everyone  · Cholesterol. Have the fat (cholesterol) in your blood tested after age 21. Your doctor will tell you how often to have this done based on your age, family history, or other things that can increase your risk for heart disease. · Blood pressure. Have your blood pressure checked during a routine doctor visit. Your doctor will tell you how often to check your blood pressure based on your age, your blood pressure results, and other factors. · Vision.  Talk with your doctor about how often to have a glaucoma test.  · Diabetes. Ask your doctor whether you should have tests for diabetes. · Colon cancer. Your risk for colorectal cancer gets higher as you get older. Some experts say that adults should start regular screening at age 48 and stop at age 76. Others say to start before age 48 or continue after age 76. Talk with your doctor about your risk and when to start and stop screening. For women  · Breast exam and mammogram. Talk to your doctor about when you should have a clinical breast exam and a mammogram. Medical experts differ on whether and how often women under 50 should have these tests. Your doctor can help you decide what is right for you. · Cervical cancer screening test and pelvic exam. Begin with a Pap test at age 24. The test often is part of a pelvic exam. Starting at age 27, you may choose to have a Pap test, an HPV test, or both tests at the same time (called co-testing). Talk with your doctor about how often to have testing. · Tests for sexually transmitted infections (STIs). Ask whether you should have tests for STIs. You may be at risk if you have sex with more than one person, especially if your partners do not wear condoms. For men  · Tests for sexually transmitted infections (STIs). Ask whether you should have tests for STIs. You may be at risk if you have sex with more than one person, especially if you do not wear a condom. · Testicular cancer exam. Ask your doctor whether you should check your testicles regularly. · Prostate exam. Talk to your doctor about whether you should have a blood test (called a PSA test) for prostate cancer. Experts differ on whether and when men should have this test. Some experts suggest it if you are older than 39 and are -American or have a father or brother who got prostate cancer when he was younger than 72. When should you call for help?   Watch closely for changes in your health, and be sure to contact your doctor if you have any problems or symptoms that concern you. Where can you learn more? Go to http://olga-kendra.info/. Enter P072 in the search box to learn more about \"Well Visit, Ages 25 to 48: Care Instructions. \"  Current as of: December 13, 2018  Content Version: 12.2  © 2800-6166 [employer]. Care instructions adapted under license by [employer] (which disclaims liability or warranty for this information). If you have questions about a medical condition or this instruction, always ask your healthcare professional. [address] any warranty or liability for your use of this information.

## 2020-01-24 NOTE — PROGRESS NOTES
Chief Complaint   Patient presents with    New Patient     previously seen by Dr. Susu Peraza at [facility]     pt wants blood work for Hep B and HIV.  pt is fasting     \"REVIEWED RECORD IN PREPARATION FOR VISIT AND HAVE OBTAINED THE NECESSARY DOCUMENTATION\"  1. Have you been to the ER, urgent care clinic since your last visit? Hospitalized since your last visit? No    2. Have you seen or consulted any other health care providers outside of the [address] [place] [address] since your last visit? Include any pap smears or colon screening.  No

## 2020-01-24 NOTE — PROGRESS NOTES
[facility] Note  Subjective:      Aba Hicks is a 30 y.o. female who presents for an acute visit with the following chief complaints. Chief Complaint   Patient presents with    New Patient     previously seen by Dr. Margaux Zamora at [facility]     pt wants blood work for Hep B and HIV.  pt is fasting     History of anxiety. Lexapro was very helpful in the past.   Clenching teeth at night with stress. Using  but still feels sore in the morning. Feels anxious all the time. Difficulty concentrating because of worrying. Previous therapy: Lexapro and CBT. No side effects. GYN: [address] [facility], reports UTD on pap. Current Outpatient Medications   Medication Sig Dispense Refill    LO LOESTRIN FE 1 mg-10 mcg (24)/10 mcg (2) tab TAKE 1 TABLET BY MOUTH EVERY DAY      escitalopram oxalate (LEXAPRO) 10 mg tablet Take 1 Tab by mouth daily. 90 Tab 1    SUMAtriptan (IMITREX) 100 mg tablet Take 1/2-1 tablets at onset of headache, repeat at 2 hours if needed, max 200mg in 24 hours. 9 Tab 5    multivitamin (ONE A DAY) tablet Take 1 Tab by mouth daily. Allergies   Allergen Reactions    Hydrocodone Nausea and Vomiting       ROS:   Complete review of systems was reviewed with pertinent information listed in HPI. Review of Systems   Constitutional: Negative for chills, diaphoresis, fever, malaise/fatigue and weight loss. No unexplained weight changes   HENT: Negative for congestion, hearing loss, sore throat and tinnitus. Eyes: Negative for blurred vision. Respiratory: Negative for cough, shortness of breath and wheezing. Cardiovascular: Negative for chest pain, palpitations, orthopnea, claudication and leg swelling. Gastrointestinal: Negative for abdominal pain, blood in stool, constipation, diarrhea, heartburn, nausea and vomiting. Genitourinary: Negative for dysuria, frequency and urgency. Musculoskeletal: Negative for joint pain and myalgias. Skin: Negative for itching and rash. Neurological: Negative for dizziness, seizures, weakness and headaches. Endo/Heme/Allergies: Negative for polydipsia. Psychiatric/Behavioral: Negative for depression, hallucinations, memory loss, substance abuse and suicidal ideas. The patient is nervous/anxious. The patient does not have insomnia. Objective:     Visit Vitals  /79 (BP 1 Location: Left arm, BP Patient Position: Sitting)   Pulse 82   Temp 98.2 °F (36.8 °C) (Oral)   Resp 16   Ht 5' 7\" (1.702 m)   Wt 158 lb (71.7 kg)   LMP 01/03/2020   SpO2 100%   BMI 24.75 kg/m²       Vitals and Nurse Documentation reviewed. Physical Exam  Vitals signs and nursing note reviewed. Constitutional:       General: She is not in acute distress. Appearance: Normal appearance. She is well-developed, well-groomed and normal weight. She is not ill-appearing, toxic-appearing or diaphoretic. HENT:      Head: Normocephalic and atraumatic. Right Ear: Hearing, tympanic membrane, ear canal and external ear normal. No middle ear effusion. Tympanic membrane is not erythematous. Left Ear: Hearing, tympanic membrane, ear canal and external ear normal.  No middle ear effusion. Tympanic membrane is not erythematous. Nose: Nose normal. No nasal deformity, septal deviation or congestion. Mouth/Throat:      Mouth: Mucous membranes are moist. Mucous membranes are not pale, not dry and not cyanotic. Dentition: Normal dentition. No dental caries. Pharynx: Oropharynx is clear. Uvula midline. No oropharyngeal exudate. Eyes:      General: Lids are normal.      Conjunctiva/sclera: Conjunctivae normal.      Right eye: Right conjunctiva is not injected. Left eye: Left conjunctiva is not injected. Pupils: Pupils are equal, round, and reactive to light. Neck:      Musculoskeletal: Normal range of motion and neck supple.       Thyroid: No thyroid mass or thyromegaly. Trachea: Phonation normal. No tracheal deviation. Cardiovascular:      Rate and Rhythm: Normal rate and regular rhythm. Pulses:           Radial pulses are 2+ on the right side and 2+ on the left side. Dorsalis pedis pulses are 2+ on the right side and 2+ on the left side. Heart sounds: Normal heart sounds, S1 normal and S2 normal. No murmur. No friction rub. No gallop. Pulmonary:      Effort: Pulmonary effort is normal. No accessory muscle usage or respiratory distress. Breath sounds: Normal breath sounds. No decreased breath sounds, wheezing, rhonchi or rales. Chest:      Chest wall: No tenderness. Abdominal:      General: Abdomen is flat. Bowel sounds are normal. There is no distension or abdominal bruit. Palpations: Abdomen is soft. There is no mass. Tenderness: There is no tenderness. There is no guarding or rebound. Musculoskeletal: Normal range of motion. General: No tenderness or deformity. Right lower leg: No edema. Left lower leg: No edema. Lymphadenopathy:      Head:      Right side of head: No submental, submandibular, tonsillar, preauricular, posterior auricular or occipital adenopathy. Left side of head: No submental, submandibular, tonsillar, preauricular, posterior auricular or occipital adenopathy. Cervical: No cervical adenopathy. Upper Body:      Right upper body: No supraclavicular adenopathy. Left upper body: No supraclavicular adenopathy. Skin:     General: Skin is warm and dry. Capillary Refill: Capillary refill takes less than 2 seconds. Findings: No rash. Nails: There is no clubbing. Neurological:      Mental Status: She is alert and oriented to person, place, and time. Cranial Nerves: Cranial nerves are intact. Sensory: Sensation is intact. Gait: Gait is intact. Deep Tendon Reflexes: Reflexes are normal and symmetric.    Psychiatric: Attention and Perception: Attention normal.         Mood and Affect: Mood and affect normal.         Behavior: Behavior normal. Behavior is cooperative. Thought Content: Thought content normal.         Cognition and Memory: Cognition normal.         Judgment: Judgment normal.         Assessment/Plan:     Diagnoses and all orders for this visit:    1. Routine adult health maintenance: Healthy 30 y.o. female, without abnormal findings on exam. Requesting HIV & Hepatitis screening, works as dental hygienist.   -     METABOLIC PANEL, COMPREHENSIVE  -     CBC WITH AUTOMATED DIFF  -     TSH REFLEX TO T4  -     HIV 1/2 AG/AB, 4TH GENERATION,W RFLX CONFIRM  -     CHRONIC HEPATITIS PANEL    2. GARRETT (generalized anxiety disorder): Longstanding issue, mild to moderate symptoms. Requesting to restart low dose Lexapro. Advised CBT. 3 month follow-up. -     escitalopram oxalate (LEXAPRO) 10 mg tablet; Take 1 Tab by mouth daily.  -     TSH REFLEX TO T4    3. Family history of vitamin B12 deficiency: Requesting screening.   -     VITAMIN B12      Follow-up and Dispositions    · Return in about 3 months (around 4/24/2020) for Follow-up, Anxiety.          Discussed expected course/resolution/complications of diagnosis in detail with patient.    Medication risks/benefits/costs/interactions/alternatives discussed with patient.    Pt was given an after visit summary which includes diagnoses, current medications & vitals.  Pt expressed understanding with the diagnosis and plan

## 2020-01-26 LAB
ALBUMIN SERPL-MCNC: 4.7 G/DL (ref 3.9–5)
ALBUMIN/GLOB SERPL: 1.7 {RATIO} (ref 1.2–2.2)
ALP SERPL-CCNC: 44 IU/L (ref 39–117)
ALT SERPL-CCNC: 12 IU/L (ref 0–32)
AST SERPL-CCNC: 13 IU/L (ref 0–40)
BASOPHILS # BLD AUTO: NORMAL 10*3/UL
BILIRUB SERPL-MCNC: 0.6 MG/DL (ref 0–1.2)
BUN SERPL-MCNC: 8 MG/DL (ref 6–20)
BUN/CREAT SERPL: 11 (ref 9–23)
CALCIUM SERPL-MCNC: 10 MG/DL (ref 8.7–10.2)
CHLORIDE SERPL-SCNC: 101 MMOL/L (ref 96–106)
CO2 SERPL-SCNC: 19 MMOL/L (ref 20–29)
COMMENT, 144067: NORMAL
CREAT SERPL-MCNC: 0.74 MG/DL (ref 0.57–1)
EOSINOPHIL # BLD AUTO: NORMAL 10*3/UL
EOSINOPHIL NFR BLD AUTO: NORMAL %
GLOBULIN SER CALC-MCNC: 2.7 G/DL (ref 1.5–4.5)
GLUCOSE SERPL-MCNC: 85 MG/DL (ref 65–99)
HBV CORE AB SERPL QL IA: NEGATIVE
HBV CORE IGM SERPL QL IA: NEGATIVE
HBV E AB SERPL QL IA: NEGATIVE
HBV E AG SERPL QL IA: NEGATIVE
HBV SURFACE AB SER QL: REACTIVE
HBV SURFACE AG SERPL QL IA: NEGATIVE
HCT VFR BLD AUTO: NORMAL %
HCV AB S/CO SERPL IA: <0.1 S/CO RATIO (ref 0–0.9)
HGB BLD-MCNC: NORMAL G/DL
HIV 1+2 AB+HIV1 P24 AG SERPL QL IA: NON REACTIVE
LYMPHOCYTES # BLD AUTO: NORMAL 10*3/UL
LYMPHOCYTES NFR BLD AUTO: NORMAL %
MONOCYTES NFR BLD AUTO: NORMAL %
NEUTROPHILS NFR BLD AUTO: NORMAL %
PLATELET # BLD AUTO: NORMAL 10*3/UL
POTASSIUM SERPL-SCNC: 4.3 MMOL/L (ref 3.5–5.2)
PROT SERPL-MCNC: 7.4 G/DL (ref 6–8.5)
RBC # BLD AUTO: NORMAL 10*6/UL
SODIUM SERPL-SCNC: 138 MMOL/L (ref 134–144)
TSH SERPL DL<=0.005 MIU/L-ACNC: 1.74 UIU/ML (ref 0.45–4.5)
VIT B12 SERPL-MCNC: 450 PG/ML (ref 232–1245)
WBC # BLD AUTO: NORMAL X10E3/UL

## 2020-01-27 ENCOUNTER — TELEPHONE (OUTPATIENT)
Dept: FAMILY MEDICINE CLINIC | Age: 25
End: 2020-01-27

## 2020-01-27 DIAGNOSIS — Z00.00 ROUTINE GENERAL MEDICAL EXAMINATION AT A HEALTH CARE FACILITY: Primary | ICD-10-CM

## 2020-01-27 NOTE — TELEPHONE ENCOUNTER
CBC was cancelled b/c not enough bld.   Called pt to let her know that she can come tomorrow, she doesn't have to fast.

## 2020-01-27 NOTE — TELEPHONE ENCOUNTER
----- Message from Melanie Anna sent at 1/27/2020  8:58 AM EST -----  Regarding: Ryan Hearing Telephone  Contact: [phone]  Caller's first and last name: N/A  Reason for call: Blood work   Callback required yes/no and why: Yes   Best contact number(s): [phone]  Details to clarify the request: Pt stated that she was seen on Friday, January 24, 2020 09:00 AM and got blood work done. Pt stated that she looked on My chart for her results and it said insufficient. Pt would like to come back and get more blood work done.

## 2020-01-28 NOTE — PROGRESS NOTES
Richardson Jimenez,     The lab was unable to perform the CBC test that was ordered. I will reorder this and you can return at your convenience for a lab only visit. Your screening for hepatitis B & C was negative and you have good immunity to hepatitis B. Screening for HIV was negative. Your remaining labs are at goal. Please let me know if you have any additional questions.     Chad Hampton NP

## 2020-01-29 LAB
BASOPHILS # BLD AUTO: 0 X10E3/UL (ref 0–0.2)
BASOPHILS NFR BLD AUTO: 0 %
EOSINOPHIL # BLD AUTO: 0 X10E3/UL (ref 0–0.4)
EOSINOPHIL NFR BLD AUTO: 0 %
ERYTHROCYTE [DISTWIDTH] IN BLOOD BY AUTOMATED COUNT: 12 % (ref 11.7–15.4)
HCT VFR BLD AUTO: 40.5 % (ref 34–46.6)
HGB BLD-MCNC: 13.9 G/DL (ref 11.1–15.9)
IMM GRANULOCYTES # BLD AUTO: 0 X10E3/UL (ref 0–0.1)
IMM GRANULOCYTES NFR BLD AUTO: 0 %
LYMPHOCYTES # BLD AUTO: 2.7 X10E3/UL (ref 0.7–3.1)
LYMPHOCYTES NFR BLD AUTO: 31 %
MCH RBC QN AUTO: 31.2 PG (ref 26.6–33)
MCHC RBC AUTO-ENTMCNC: 34.3 G/DL (ref 31.5–35.7)
MCV RBC AUTO: 91 FL (ref 79–97)
MONOCYTES # BLD AUTO: 0.6 X10E3/UL (ref 0.1–0.9)
MONOCYTES NFR BLD AUTO: 7 %
NEUTROPHILS # BLD AUTO: 5.2 X10E3/UL (ref 1.4–7)
NEUTROPHILS NFR BLD AUTO: 62 %
PLATELET # BLD AUTO: 342 X10E3/UL (ref 150–450)
RBC # BLD AUTO: 4.46 X10E6/UL (ref 3.77–5.28)
WBC # BLD AUTO: 8.6 X10E3/UL (ref 3.4–10.8)

## 2020-01-29 NOTE — PROGRESS NOTES
Richardson Jimenez,     Your CBC looks great. No significant abnormalities. Please let me know if you have any additional questions.     Erik Martell NP

## 2020-04-17 ENCOUNTER — VIRTUAL VISIT (OUTPATIENT)
Dept: FAMILY MEDICINE CLINIC | Age: 25
End: 2020-04-17

## 2020-04-17 DIAGNOSIS — F41.1 GAD (GENERALIZED ANXIETY DISORDER): Primary | ICD-10-CM

## 2020-04-17 RX ORDER — GLUCOSAMINE/CHONDR SU A SOD 750-600 MG
TABLET ORAL
COMMUNITY

## 2020-04-17 RX ORDER — ASCORBIC ACID 250 MG
TABLET ORAL
COMMUNITY

## 2020-04-17 RX ORDER — CHOLECALCIFEROL (VITAMIN D3) 50 MCG
CAPSULE ORAL
COMMUNITY
End: 2022-05-13 | Stop reason: ALTCHOICE

## 2020-04-17 NOTE — PROGRESS NOTES
Chief Complaint   Patient presents with    Medication Evaluation     follow up of Lexapro. pt states she is doing well with medication. \"REVIEWED RECORD IN PREPARATION FOR VISIT AND HAVE OBTAINED THE NECESSARY DOCUMENTATION\"  1. Have you been to the ER, urgent care clinic since your last visit? Hospitalized since your last visit? No    2. Have you seen or consulted any other health care providers outside of the [address] [place] [address] since your last visit? Include any pap smears or colon screening.  No

## 2020-04-17 NOTE — PROGRESS NOTES
[address] Note  Subjective:      Janel Raines is a 30 y.o. female who presents for an acute visit with the following chief complaints. Chief Complaint   Patient presents with    Medication Evaluation     follow up of Lexapro. pt states she is doing well with medication. I was in the office while conducting this encounter. Consent:  She and/or her healthcare decision maker is aware that this patient-initiated Telehealth encounter is a billable service, with coverage as determined by her insurance carrier. She is aware that she may receive a bill and has provided verbal consent to proceed: Yes    This virtual visit was conducted via [address] Pursuant to the emergency declaration under the [address], 1135 waiver authority and the [employer] and [employer] Act, this Virtual  Visit was conducted to reduce the patient's risk of exposure to COVID-19 and provide continuity of care for an established patient. Services were provided through a video synchronous discussion virtually to substitute for in-person clinic visit. Due to this being a TeleHealth evaluation, many elements of the physical examination are unable to be assessed. Total Time: minutes: 5-10 minutes. Anxiety  She is an 30 y.o. female seen for follow up of of anxiety disorder. She reported the following anxiety symptoms: feeling stressed, overwhelmed, clenching teeth nightly, difficulty concentrating. Onset of symptoms was approximately several years ago, intermittent since that time. She denies current suicidal and homicidal ideation. Family history significant for nothing. Possible organic causes contributing are: none. Risk factors: previous episode of anxiety. Current treatment: Lexaro 10 mg daily. Since restarting therapy 3 months ago, she notes significant improvement. She is happy with how she feels.   She complains of the following side effects from the treatment: none. Previous treatment includes none. Previous therapy: CBT, not interested in CBT currently. Current Outpatient Medications   Medication Sig Dispense Refill    ascorbic acid, vitamin C, (Vitamin C) 250 mg tablet Take  by mouth.  Biotin 2,500 mcg cap Take  by mouth.  omega 3-dha-epa-fish oil (Fish Oil) 100-160-1,000 mg cap Take  by mouth.  LO LOESTRIN FE 1 mg-10 mcg (24)/10 mcg (2) tab TAKE 1 TABLET BY MOUTH EVERY DAY      escitalopram oxalate (LEXAPRO) 10 mg tablet Take 1 Tab by mouth daily. 90 Tab 1    SUMAtriptan (IMITREX) 100 mg tablet Take 1/2-1 tablets at onset of headache, repeat at 2 hours if needed, max 200mg in 24 hours. 9 Tab 5    multivitamin (ONE A DAY) tablet Take 1 Tab by mouth daily. Allergies   Allergen Reactions    Hydrocodone Nausea and Vomiting       ROS:   Complete review of systems was reviewed with pertinent information listed in HPI. Review of Systems   Constitutional: Negative for chills, diaphoresis, fever, malaise/fatigue and weight loss. HENT: Negative for congestion and sore throat. Eyes: Negative for blurred vision. Respiratory: Negative for cough. Cardiovascular: Negative for chest pain and palpitations. Gastrointestinal: Negative for abdominal pain, diarrhea, nausea and vomiting. Skin: Negative. Neurological: Negative for dizziness and headaches. Psychiatric/Behavioral: Negative for depression, hallucinations, memory loss, substance abuse and suicidal ideas. The patient is not nervous/anxious and does not have insomnia. Objective:     Visit Vitals  [facility] 03/27/2020       Vitals and Nurse Documentation reviewed. Physical Exam  Constitutional:       General: She is not in acute distress. Appearance: Normal appearance. She is well-developed, well-groomed and normal weight. She is not ill-appearing or diaphoretic. HENT:      Head: Normocephalic and atraumatic.       Right Ear: Hearing normal.      Left Ear: Hearing normal.      Mouth/Throat:      Lips: Pink. No lesions. Mouth: Mucous membranes are moist.   Eyes:      General: Lids are normal. Vision grossly intact. Conjunctiva/sclera: Conjunctivae normal.      Right eye: Right conjunctiva is not injected. Left eye: Left conjunctiva is not injected. Pulmonary:      Effort: Pulmonary effort is normal.   Neurological:      Mental Status: She is alert and oriented to person, place, and time. Psychiatric:         Attention and Perception: Attention normal.         Mood and Affect: Mood normal.         Speech: Speech normal.         Behavior: Behavior is cooperative. Thought Content: Thought content normal.         Cognition and Memory: Cognition normal.         Judgment: Judgment normal.         Assessment/Plan:     Diagnoses and all orders for this visit:    1. GARRETT (generalized anxiety disorder): Recurrent issue. Significant improvement in symptoms since restarting Lexapro 10 mg. Denies continued anxiety. Continue current therapy. 3 month follow-up. Follow-up and Dispositions    · Return in about 3 months (around 7/17/2020) for Follow-up.          Discussed expected course/resolution/complications of diagnosis in detail with patient.    Medication risks/benefits/costs/interactions/alternatives discussed with patient.    Pt was given an after visit summary which includes diagnoses, current medications & vitals.  Pt expressed understanding with the diagnosis and plan

## 2021-01-08 ENCOUNTER — VIRTUAL VISIT (OUTPATIENT)
Dept: FAMILY MEDICINE CLINIC | Age: 26
End: 2021-01-08
Payer: COMMERCIAL

## 2021-01-08 DIAGNOSIS — G43.909 MIGRAINE WITHOUT STATUS MIGRAINOSUS, NOT INTRACTABLE, UNSPECIFIED MIGRAINE TYPE: ICD-10-CM

## 2021-01-08 PROCEDURE — 99213 OFFICE O/P EST LOW 20 MIN: CPT | Performed by: FAMILY MEDICINE

## 2021-01-08 RX ORDER — SUMATRIPTAN 100 MG/1
100 TABLET, FILM COATED ORAL
Qty: 9 TAB | Refills: 1 | Status: SHIPPED | OUTPATIENT
Start: 2021-01-08 | End: 2022-04-08

## 2021-01-08 NOTE — PROGRESS NOTES
Jaime Christianson, who was evaluated through a synchronous (real-time) audio-video encounter, and/or her healthcare decision maker, is aware that it is a billable service, with coverage as determined by her insurance carrier. She provided verbal consent to proceed: YES, and patient identification was verified. It was conducted pursuant to the emergency declaration under the [facility], [facility] authority and the [employer] Act. A caregiver was present when appropriate. Ability to conduct physical exam was limited. I was at home. The patient was at home. This virtual visit was conducted via [employer]. Pursuant to the emergency declaration under the [facility], [address] [place] [address] authority and the [employer] and [employer] Act, this Virtual  Visit was conducted to reduce the patient's risk of exposure to COVID-19 and provide continuity of care for an established patient. Services were provided through a video synchronous discussion virtually to substitute for in-person clinic visit. Due to this being a TeleHealth evaluation, many elements of the physical examination are unable to be assessed. Total Time: minutes: 5-10 minutes. Zaina Guerin MD    290  Subjective:   Jaime Christianson was seen for:    Needs refills for prn Imitrex; was previously prescribed by prior PCP. Migraines occur once every 6 weeks. Does well with Imitrex. Prior to Admission medications    Medication Sig Start Date End Date Taking? Authorizing Provider   ascorbic acid, vitamin C, (Vitamin C) 250 mg tablet Take  by mouth. Provider, Historical   Biotin 2,500 mcg cap Take  by mouth. Provider, Historical   omega 3-dha-epa-fish oil (Fish Oil) 100-160-1,000 mg cap Take  by mouth.     Provider, Historical   LO LOESTRIN FE 1 mg-10 mcg (24)/10 mcg (2) tab TAKE 1 TABLET BY MOUTH EVERY DAY 11/15/19   Provider, Historical   escitalopram oxalate (LEXAPRO) 10 mg tablet Take 1 Tab by mouth daily. 1/24/20   Jolynn Felipe NP   SUMAtriptan (IMITREX) 100 mg tablet Take 1/2-1 tablets at onset of headache, repeat at 2 hours if needed, max 200mg in 24 hours. 9/26/17   Patience Ricketts MD   multivitamin (ONE A DAY) tablet Take 1 Tab by mouth daily. Provider, Historical       Allergies   Allergen Reactions    Hydrocodone Nausea and Vomiting       Review of Systems   Constitutional: Negative for fever, malaise/fatigue and weight loss. Respiratory: Negative for cough, hemoptysis, shortness of breath and wheezing. Cardiovascular: Negative for chest pain, palpitations, leg swelling and PND. Gastrointestinal: Negative for abdominal pain, constipation, diarrhea, nausea and vomiting. Neurological: Positive for headaches. Physical Exam:     There were no vitals taken for this visit. General: alert, cooperative, no distress   Mental  status: normal mood, behavior, speech, dress, motor activity, and thought processes, able to follow commands   HENT: NCAT   Neck: no visualized mass   Resp: no respiratory distress   Neuro: no gross deficits   Skin: no discoloration or lesions of concern on visible areas   Psychiatric: normal affect, consistent with stated mood, no evidence of hallucinations       Assessment & Plan:     Elida Andrew is a 30 y.o. female who presents today for:    1. Migraine without status migrainosus, not intractable, unspecified migraine type  Stable, continue current treatment.  - SUMAtriptan (IMITREX) 100 mg tablet; Take 1 Tab by mouth once as needed for Migraine for up to 1 dose. Dispense: 9 Tab; Refill: 1       Medications Discontinued During This Encounter   Medication Reason    SUMAtriptan (IMITREX) 100 mg tablet REORDER           Treatment risks/benefits/costs/interactions/alternatives discussed with patient.   Advised patient to call back or return to office if symptoms worsen/change/persist. If patient cannot reach us or should anything more severe/urgent arise he/she should proceed directly to the nearest emergency department. Discussed expected course/resolution/complications of diagnosis in detail with patient. Patient expressed understanding with the diagnosis and plan. Louisa Medel M.D.

## 2022-05-13 ENCOUNTER — OFFICE VISIT (OUTPATIENT)
Dept: FAMILY MEDICINE CLINIC | Age: 27
End: 2022-05-13
Payer: COMMERCIAL

## 2022-05-13 VITALS
TEMPERATURE: 97.3 F | HEART RATE: 84 BPM | BODY MASS INDEX: 23.7 KG/M2 | DIASTOLIC BLOOD PRESSURE: 64 MMHG | OXYGEN SATURATION: 99 % | SYSTOLIC BLOOD PRESSURE: 99 MMHG | HEIGHT: 67 IN | WEIGHT: 151 LBS | RESPIRATION RATE: 18 BRPM

## 2022-05-13 DIAGNOSIS — F41.9 ANXIETY AND DEPRESSION: ICD-10-CM

## 2022-05-13 DIAGNOSIS — F32.A ANXIETY AND DEPRESSION: ICD-10-CM

## 2022-05-13 DIAGNOSIS — H61.23 BILATERAL IMPACTED CERUMEN: ICD-10-CM

## 2022-05-13 DIAGNOSIS — Z00.00 WELL WOMAN EXAM WITHOUT GYNECOLOGICAL EXAM: Primary | ICD-10-CM

## 2022-05-13 PROCEDURE — 99213 OFFICE O/P EST LOW 20 MIN: CPT | Performed by: STUDENT IN AN ORGANIZED HEALTH CARE EDUCATION/TRAINING PROGRAM

## 2022-05-13 PROCEDURE — 69209 REMOVE IMPACTED EAR WAX UNI: CPT | Performed by: STUDENT IN AN ORGANIZED HEALTH CARE EDUCATION/TRAINING PROGRAM

## 2022-05-13 PROCEDURE — 99395 PREV VISIT EST AGE 18-39: CPT | Performed by: STUDENT IN AN ORGANIZED HEALTH CARE EDUCATION/TRAINING PROGRAM

## 2022-05-13 RX ORDER — DROSPIRENONE AND ETHINYL ESTRADIOL 0.02-3(28)
1 KIT ORAL DAILY
COMMUNITY
Start: 2022-04-03

## 2022-05-13 NOTE — PROGRESS NOTES
Otto Osorio  27 y.o. female  1995  [address] [place] [address] [place] [id number]  [id number]     [facility]       Chief Complaint: CPE  Source: self     Subjective:   Otto Osorio is an 27 y.o. female who presents for complete physical exam.    Anxiety  Previously took lexapro. It made her feel great but she gained 20-25lb while on it and does not want to restart it 2/2 weight gain. She would like to est care with a therapist. Has not reached out to [facility] or insurance for preferred providers. Mother is a drug addict and father/step mother recovering alcoholics so anxieties stem from that. Recently cut ties with mom which has been difficult.  has a very \"normal\" family so they spend all the holidays with them. She has been to [facility] with her father and stepmother but not to [address] [facility]. She does endorse periods of being very happy, productive with alternating periods of depression. Sleeps well, no issues with concentration nor focus and appetite is good. She works out 4 or more days/week which helps significantly with her anxiety. Ear Wax  Has had cerumen removed with ENT in the past; required suction. Suspects ears are full of wax today. No pain, drainage. She has tried debrox without improvement. She does use Q tips in her ears    Diet: no particular diet and eats a variety of foods. Watches what she eats       Allergies - reviewed: Allergies   Allergen Reactions    Hydrocodone Nausea and Vomiting         Medications - reviewed:  Current Outpatient Medications   Medication Sig    Sia, 28, 3-0.02 mg tab Take 1 Tablet by mouth daily.  SUMAtriptan (IMITREX) 100 mg tablet TAKE 1 TABLET BY MOUTH ONCE AS NEEDED FOR MIGRAINE FOR UP TO 1 DOSE    ascorbic acid, vitamin C, (Vitamin C) 250 mg tablet Take  by mouth.  Biotin 2,500 mcg cap Take  by mouth.  multivitamin (ONE A DAY) tablet Take 1 Tab by mouth daily. No current facility-administered medications for this visit. Past Medical History - reviewed:  Past Medical History:   Diagnosis Date    Anxiety     HPV test positive     Low vitamin B12 level     Lyme disease 2009    no residual symptoms    Migraines     no headaches in several years         Past Surgical History - reviewed:  History reviewed. No pertinent surgical history. Family History - reviewed:  Family History   Problem Relation Age of Onset    Diabetes Mother     Hypertension Mother     High Cholesterol Father     No Known Problems Sister     Breast Cancer Maternal Grandmother     Alzheimer's Disease Paternal Grandmother     Alzheimer's Disease Paternal Grandfather          Social History - reviewed:  Social History     Socioeconomic History    Marital status:      Spouse name: Not on file    Number of children: Not on file    Years of education: Not on file    Highest education level: Not on file   Occupational History    Not on file   Tobacco Use    Smoking status: Never Smoker    Smokeless tobacco: Never Used   Vaping Use    Vaping Use: Never used   Substance and Sexual Activity    Alcohol use: Yes    Drug use: No    Sexual activity: Yes     Partners: Male   Other Topics Concern    Not on file   Social History Narrative    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     Exercise: 3-4 days per week. Ashly Valdes. Social Determinants of Health     Financial Resource Strain:     Difficulty of Paying Living Expenses: Not on file   Food Insecurity:     Worried About Running Out of Food in the Last Year: Not on file    Shey of Food in the Last Year: Not on file   Transportation Needs:     Lack of Transportation (Medical): Not on file    Lack of Transportation (Non-Medical):  Not on file   Physical Activity:     Days of Exercise per Week: Not on file    Minutes of Exercise per Session: Not on file   Stress:     Feeling of Stress : Not on file   Social Connections:     Frequency of Communication with Friends and Family: Not on file    Frequency of Social Gatherings with Friends and Family: Not on file    Attends Sabianism Services: Not on file    Active Member of Clubs or Organizations: Not on file    Attends Club or Organization Meetings: Not on file    Marital Status: Not on file   Intimate Partner Violence:     Fear of Current or Ex-Partner: Not on file    Emotionally Abused: Not on file    Physically Abused: Not on file    Sexually Abused: Not on file   Housing Stability:     Unable to Pay for Housing in the Last Year: Not on file    Number of [place] in the Last Year: Not on file    Unstable Housing in the Last Year: Not on file         Immunizations - reviewed:   Immunization History   Administered Date(s) Administered    Influenza Vaccine 10/29/2019    Influenza Vaccine [employer]) PF (>6 Mo Flulaval, Fluarix, and [address] [facility]) 10/08/2019    Tdap 07/22/2014         Health Maintenance reviewed -  Health Maintenance reviewed - patient asked to schedule her pap smear. Colonoscopy Not indicated based on fam hx and age   HIV testing neg at last visit  Hepatitis C testing neg at last visit  Depression Screen completed today      DEXA scan Not indicated based on age  [de-identified] Not indicated based on age and neg fam hx  PAP UTD, sees Dr Stanley Villaseñor      Review of Systems   Constitutional: Negative for chills and fever. HENT: Negative for congestion, ear discharge and ear pain. Eyes: Negative for blurred vision and double vision. Respiratory: Negative for cough, sputum production and shortness of breath. Cardiovascular: Negative for chest pain and palpitations. Gastrointestinal: Negative for nausea and vomiting. Genitourinary: Negative for dysuria and urgency. Musculoskeletal: Negative for myalgias and neck pain. Neurological: Negative for dizziness and headaches. Psychiatric/Behavioral: Positive for depression.  Negative for hallucinations, substance abuse and suicidal ideas. The patient is nervous/anxious. The patient does not have insomnia. Objective:     Visit Vitals  BP 99/64 (BP 1 Location: Left upper arm, BP Patient Position: Sitting, BP Cuff Size: Adult)   Pulse 84   Temp 97.3 °F (36.3 °C) (Temporal)   Resp 18   Ht 5' 7\" (1.702 m)   Wt 151 lb (68.5 kg)   LMP 04/26/2022 (Exact Date)   SpO2 99%   BMI 23.65 kg/m²       Physical Exam  Constitutional:       General: She is not in acute distress. Appearance: Normal appearance. She is normal weight. She is not toxic-appearing. HENT:      Head: Normocephalic and atraumatic. Right Ear: Ear canal and external ear normal. There is impacted cerumen. Left Ear: Ear canal and external ear normal.  No middle ear effusion. There is no impacted cerumen. Tympanic membrane is not injected, scarred, perforated, erythematous, retracted or bulging. Eyes:      Extraocular Movements: Extraocular movements intact. Neck:      Thyroid: No thyroid mass, thyromegaly or thyroid tenderness. Cardiovascular:      Rate and Rhythm: Normal rate. Heart sounds: Normal heart sounds, S1 normal and S2 normal. Heart sounds not distant. No murmur heard. No systolic murmur is present. No diastolic murmur is present. Pulmonary:      Effort: Pulmonary effort is normal.      Breath sounds: Normal breath sounds and air entry. No decreased air movement or transmitted upper airway sounds. No decreased breath sounds, wheezing, rhonchi or rales. Musculoskeletal:      Cervical back: Full passive range of motion without pain and normal range of motion. Normal range of motion. Right lower leg: No edema. Left lower leg: No edema. Lymphadenopathy:      Cervical: No cervical adenopathy. Skin:     General: Skin is warm and dry. Findings: No signs of injury, lesion or rash. Neurological:      Mental Status: She is alert.    Psychiatric:         Attention and Perception: Attention and perception normal. Mood and Affect: Mood normal.         Speech: Speech normal.         Behavior: Behavior normal. Behavior is cooperative. Thought Content: Thought content normal. Thought content does not include homicidal or suicidal ideation. Thought content does not include homicidal or suicidal plan. Cognition and Memory: Cognition normal.         Judgment: Judgment normal.      Comments: Wringing hands, poor eye contact when asking about anxiety/depression and slightly tearful when talking about her mother's substance use and recent cutting ties. Assessment:       ICD-10-CM ICD-9-CM    1. Well woman exam without gynecological exam  Z00.00 V70.0    2. Bilateral impacted cerumen  H61.23 380.4 REFERRAL TO ENT-OTOLARYNGOLOGY   3. Anxiety and depression  F41.9 300.00     F32. A 311        Plan:   1. Well woman exam without gynecological exam  - PMHx, SocHx, Famhx, immunizations updated  - discussed screen for HLD, CMP today; patient declined as lab work was done last year and was normal    2. Bilateral impacted cerumen: attempted removal today with flush and curette. Was able to remove from left ear however ceruminosis of right ear is significant and deep in the canal so prefer to have ENT assess  - REFERRAL TO ENT-OTOLARYNGOLOGY    3. Anxiety and depression: did not complete phq-9 or GARRETT-7 today. Scored 0 on phq-2 and garrett-2. Difficult to get her to discuss symptoms and with notable change in posturing during discussion. She will certainly benefit from good therapist. Encouraged patient to reach out to insurance and EAP for list of preferred providers. She is aware that she can also see [address] [place] [address] in our office or followup for further recommendations.  Should have eval for BPD given symptoms but unable to gain enough history today to determine if clear etiology and no meds being prescribed  - REFERRAL TO BEHAVIORAL MEDICINE  - recommended [employer] and provided site to search those nearby  - followup for discussion of medication if decides to resume pharacologic treatment    · Counseled re: diet, exercise, healthy lifestyle    · Appropriate labs, vaccines, imaging studies, and referrals ordered as listed above    Follow-up and Dispositions    · Return in about 1 year (around 5/13/2023) for complete physical exam.         I have discussed the diagnosis with the patient and the intended plan as seen in the above orders. The patient has received an after-visit summary and questions were answered concerning future plans. I have discussed medication side effects and warnings with the patient as well. Informed pt to return to the office if new symptoms arise.       Martha Solsi MD  [facility]

## 2022-05-13 NOTE — PATIENT INSTRUCTIONS
In case you want to try an [employer] meeting: [email or web address]    Please reach out if you need assistance with establishing with a therapist ie additional names of providers OR if you are interested in resuming medication for your anxiety.

## 2022-05-13 NOTE — PROGRESS NOTES
Chief Complaint   Patient presents with    Complete Physical     1. \"Have you been to the ER, urgent care clinic since your last visit? Hospitalized since your last visit? \" No    2. \"Have you seen or consulted any other health care providers outside of the [address] [place] [address] since your last visit? \" Yes Dr. Thompson Host 9/21      3. For patients aged 39-70: Has the patient had a colonoscopy / FIT/ Cologuard? NA - based on age      If the patient is female:    4. For patients aged 41-77: Has the patient had a mammogram within the past 2 years? NA - based on age or sex      11. For patients aged 21-65: Has the patient had a pap smear? Yes - Care Gap present.  Rooming MA/LPN to request most recent results

## 2022-08-06 DIAGNOSIS — G43.909 MIGRAINE WITHOUT STATUS MIGRAINOSUS, NOT INTRACTABLE, UNSPECIFIED MIGRAINE TYPE: ICD-10-CM

## 2022-08-08 RX ORDER — SUMATRIPTAN 100 MG/1
TABLET, FILM COATED ORAL
Qty: 9 TABLET | Refills: 0 | Status: SHIPPED | OUTPATIENT
Start: 2022-08-08 | End: 2022-09-05

## 2023-01-19 ENCOUNTER — OFFICE VISIT (OUTPATIENT)
Dept: FAMILY MEDICINE CLINIC | Age: 28
End: 2023-01-19
Payer: COMMERCIAL

## 2023-01-19 VITALS
TEMPERATURE: 97.5 F | OXYGEN SATURATION: 98 % | HEIGHT: 67 IN | WEIGHT: 150.8 LBS | SYSTOLIC BLOOD PRESSURE: 120 MMHG | DIASTOLIC BLOOD PRESSURE: 80 MMHG | HEART RATE: 105 BPM | RESPIRATION RATE: 18 BRPM | BODY MASS INDEX: 23.67 KG/M2

## 2023-01-19 DIAGNOSIS — F19.980 DRUG-INDUCED ANXIETY DISORDER (HCC): Primary | ICD-10-CM

## 2023-01-19 RX ORDER — HYDROXYZINE HYDROCHLORIDE 10 MG/1
10 TABLET, FILM COATED ORAL
Qty: 30 TABLET | Refills: 0 | Status: SHIPPED | OUTPATIENT
Start: 2023-01-19 | End: 2023-01-29

## 2023-01-19 NOTE — PROGRESS NOTES
Chief Complaint   Patient presents with    ED Follow-up     Shaji DHALIWAL 1/17/23 DX: Did edible and still do not feel right. Ate this apox 5 days ago. 1. \"Have you been to the ER, urgent care clinic since your last visit? Hospitalized since your last visit? \" [address]     2. \"Have you seen or consulted any other health care providers outside of the [address] [place] [address] since your last visit? \" No     3. For patients aged 39-70: Has the patient had a colonoscopy / FIT/ Cologuard? NA - based on age      If the patient is female:    4. For patients aged 41-77: Has the patient had a mammogram within the past 2 years? NA - based on age or sex      11. For patients aged 21-65: Has the patient had a pap smear? Yes - Care Gap present.  Rooming MA/LPN to request most recent results         3 most recent PHQ Screens 1/19/2023   PHQ Not Done -   Little interest or pleasure in doing things Not at all   Feeling down, depressed, irritable, or hopeless Not at all   Total Score PHQ 2 0   Trouble falling or staying asleep, or sleeping too much Not at all   Feeling tired or having little energy Not at all   Poor appetite, weight loss, or overeating Several days   Feeling bad about yourself - or that you are a failure or have let yourself or your family down Not at all   Trouble concentrating on things such as school, work, reading, or watching TV Several days   Moving or speaking so slowly that other people could have noticed; or the opposite being so fidgety that others notice Not at all   Thoughts of being better off dead, or hurting yourself in some way Not at all   PHQ 9 Score 2   How difficult have these problems made it for you to do your work, take care of your home and get along with others Somewhat difficult       Health Maintenance Due   Topic Date Due    COVID-19 Vaccine (1) Never done    Pap Smear  06/25/2021    Flu Vaccine (1) 08/01/2022

## 2023-01-19 NOTE — PROGRESS NOTES
Vikas Sharma  32 y.o. female  1995  [address] [facility] [id number]  [id number]     [facility]       Chief Complaint: ED followup - edible doesn't feel well  Source: self     Subjective  Vikas Sharma is an 32 y.o. female who presents for ED followup from [facility], ate edible over the weekend and \"Still doesn't feel right\". On day 5 of feeling hyper focused, very anxious - feels like she is having interactions with people but wondering if they are real. Lives with her  - he has not noticed. Feels like sounds around her are very loud - has a sense that things around her are not normal. Denies thoughts of self harm nor harm to others. Sleeping ok. Denies symptoms of kelly and does not think she is seeing of hearing things that are not there. Called poison control who stated version of what she took was highly hallucinogenic and should clear at 80% within 5 days and today is 5th day after taking it. Allergies - reviewed: Allergies   Allergen Reactions    Hydrocodone Nausea and Vomiting         Medications - reviewed:   Current Outpatient Medications   Medication Sig    hydrOXYzine HCL (ATARAX) 10 mg tablet Take 1 Tablet by mouth three (3) times daily as needed for Anxiety for up to 10 days. SUMAtriptan (IMITREX) 100 mg tablet TAKE 1 TABLET BY MOUTH ONCE AS NEEDED FOR MIGRAINE FOR UP TO 1 DOSE. Sia, 28, 3-0.02 mg tab Take 1 Tablet by mouth daily. ascorbic acid, vitamin C, (VITAMIN C) 250 mg tablet Take  by mouth. Biotin 2,500 mcg cap Take  by mouth.    multivitamin (ONE A DAY) tablet Take 1 Tab by mouth daily. No current facility-administered medications for this visit. 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  No pertinent surgical history. Social History - reviewed:  Social History     Socioeconomic History    Marital status:      Spouse name: Not on file    Number of children: Not on file    Years of education: Not on file    Highest education level: Not on file   Occupational History    Not on file   Tobacco Use    Smoking status: Never     Passive exposure: Past    Smokeless tobacco: Never   Vaping Use    Vaping Use: Never used   Substance and Sexual Activity    Alcohol use: Yes    Drug use: No    Sexual activity: Yes     Partners: Male   Other Topics Concern    Not on file   Social History Narrative    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     Exercise: 3-4 days per week. Ashly August. Social Determinants of Health     Financial Resource Strain: Low Risk     Difficulty of Paying Living Expenses: Not hard at all   Food Insecurity: No Food Insecurity    Worried About Running Out of Food in the Last Year: Never true    Ran Out of Food in the Last Year: Never true   Transportation Needs: Not on file   Physical Activity: Not on file   Stress: Not on file   Social Connections: Not on file   Intimate Partner Violence: Not on file   Housing Stability: Not on file         Family History - reviewed:  Family History   Problem Relation Age of Onset    Diabetes Mother     Hypertension Mother     High Cholesterol Father     No Known Problems Sister     Breast Cancer Maternal Grandmother     Alzheimer's Disease Paternal Grandmother     Alzheimer's Disease Paternal Grandfather          Immunizations - reviewed:   Immunization History   Administered Date(s) Administered    Influenza Vaccine 10/29/2019    Influenza, FLUARIX, FLULAVAL, FLUZONE (age 10 mo+) AND AFLURIA, (age 1 y+), PF, 0.5mL 10/08/2019    Tdap 07/22/2014       Review of Systems   Constitutional:  Negative for chills and fever. Cardiovascular:  Negative for chest pain and palpitations.    Neurological:  Negative for dizziness and headaches. Psychiatric/Behavioral:  Negative for hallucinations, memory loss, substance abuse and suicidal ideas. The patient is nervous/anxious. The patient does not have insomnia. Physical Exam  Visit Vitals  /80 (BP 1 Location: Left upper arm, BP Patient Position: Sitting, BP Cuff Size: Adult)   Pulse (!) 105   Temp 97.5 °F (36.4 °C) (Temporal)   Resp 18   Ht 5' 7\" (1.702 m)   Wt 150 lb 12.8 oz (68.4 kg)   LMP 01/15/2023   SpO2 98%   BMI 23.62 kg/m²       Physical Exam  Vitals and nursing note reviewed. Constitutional:       General: She is not in acute distress. Appearance: Normal appearance. She is normal weight. She is not ill-appearing, toxic-appearing or diaphoretic. Cardiovascular:      Rate and Rhythm: Tachycardia present. Comments: Bounding radial pulses. Bounding heart rate, regular rhythm. Neurological:      Mental Status: She is alert. Psychiatric:         Attention and Perception: Attention and perception normal. She does not perceive auditory or visual hallucinations. Mood and Affect: Mood is anxious. Affect is tearful. Speech: Speech normal. Speech is not rapid and pressured or slurred. Behavior: Behavior normal. Behavior is not agitated, aggressive or hyperactive. Thought Content: Thought content is not delusional. Thought content does not include homicidal or suicidal ideation. Thought content does not include homicidal or suicidal plan. Cognition and Memory: Cognition normal.         Judgment: Judgment normal.           Assessment/Plan    ICD-10-CM ICD-9-CM    1. Drug-induced anxiety disorder (HCC)  F19.980 292.89 hydrOXYzine HCL (ATARAX) 10 mg tablet          Cyndi Hu is an 32 y.o. female here today for ongoing anxiety following ingestion of edible THC/CBD gummy 5 days ago. She was seen in [facility] ED (records not available) and called poison control, neither of which had suggestions other than to wait out the symptoms.  She refused bloodwork today because she had 3 vials of blood drawn in ED. Would like to check thyroid, CMP, CBC to ensure no concomitant issues. She appears anxious and with slightly elevated HR on exam without symptoms c/w hallucinating nor kelly. She denies SI/HI and does not appear to be a danger to herself nor others. 1. Drug-induced anxiety disorder (HCC)  - hydrOXYzine HCL (ATARAX) 10 mg tablet; Take 1 Tablet by mouth three (3) times daily as needed for Anxiety for up to 10 days. Dispense: 30 Tablet; Refill: 0  - ED precautions advised for thoughts of self harm or harm to others, inability to sleep  - made followup for early next week to check in    Patient informed to follow up: Follow-up and Dispositions    Return in about 3 days (around 1/22/2023) for followup for anxiety . I have discussed the diagnosis with the patient and the intended plan as seen in the above orders. Patient verbalized understanding of the plan and agrees with the plan. The patient has received an after-visit summary and questions were answered concerning future plans. I have discussed medication side effects and warnings with the patient as well. Informed patient to return to the office if new symptoms arise.       Krishna Grant MD  [facility]

## 2023-01-24 ENCOUNTER — TELEPHONE (OUTPATIENT)
Dept: FAMILY MEDICINE CLINIC | Age: 28
End: 2023-01-24

## 2023-01-24 NOTE — TELEPHONE ENCOUNTER
Called, spoke to pt. Two pt identifiers confirmed. Patient is request something for Anxiety, crying went to ER that why she cancel appointment. Her anxiety has gotten worse. She stated she took Lexapro in pass and she gain weight .

## 2023-01-24 NOTE — TELEPHONE ENCOUNTER
----- Message from Dawna Scott sent at 1/24/2023  1:14 PM EST -----  Subject: Message to Provider    QUESTIONS  Information for Provider? pt is asking for someone to reach out to her in   regards of medication she is inquiring about . The hydrOXYzine HCL (ATARAX)   10 mg tablet make her tired please reach out in this concern   ---------------------------------------------------------------------------  --------------  Kathrin Carrier INFO  [phone]; OK to leave message on voicemail  ---------------------------------------------------------------------------  --------------  SCRIPT ANSWERS  Relationship to Patient?  Self

## 2023-01-25 DIAGNOSIS — F19.980 DRUG-INDUCED ANXIETY DISORDER (HCC): ICD-10-CM

## 2023-01-25 RX ORDER — HYDROXYZINE HYDROCHLORIDE 10 MG/1
10 TABLET, FILM COATED ORAL
Qty: 30 TABLET | Refills: 0 | Status: SHIPPED | OUTPATIENT
Start: 2023-01-25 | End: 2023-02-04

## 2023-01-25 NOTE — TELEPHONE ENCOUNTER
----- Message from Jaja Akhtar sent at 1/24/2023  8:07 AM EST -----  Subject: Medication Problem    Medication: hydrOXYzine HCL (ATARAX) 10 mg tablet  Dosage: 10MG   Ordering Provider: shant    Question/Problem: Patient was prescribed this medication and advised she   needs something she is able to take and function at work. Patient did have   a panic attack yesterday and went to the ER and is now requesting new   medication that she could take through out the day. They did Thyroid tests   at [place] [address] last night and everything was normal. Please call   patient to advise.        Pharmacy: [id number], [address] [place] [address]    ---------------------------------------------------------------------------  --------------  [facility]  [phone]; OK to leave message on voicemail  ---------------------------------------------------------------------------  --------------    SCRIPT ANSWERS  Relationship to Patient: Self

## 2023-01-26 ENCOUNTER — OFFICE VISIT (OUTPATIENT)
Dept: FAMILY MEDICINE CLINIC | Age: 28
End: 2023-01-26
Payer: COMMERCIAL

## 2023-01-26 VITALS
SYSTOLIC BLOOD PRESSURE: 105 MMHG | RESPIRATION RATE: 17 BRPM | WEIGHT: 149 LBS | HEIGHT: 67 IN | BODY MASS INDEX: 23.39 KG/M2 | TEMPERATURE: 97.7 F | HEART RATE: 98 BPM | DIASTOLIC BLOOD PRESSURE: 73 MMHG | OXYGEN SATURATION: 90 %

## 2023-01-26 DIAGNOSIS — F41.9 ANXIETY: Primary | ICD-10-CM

## 2023-01-26 DIAGNOSIS — F41.0 PANIC ATTACKS: ICD-10-CM

## 2023-01-26 RX ORDER — BUSPIRONE HYDROCHLORIDE 5 MG/1
5 TABLET ORAL 2 TIMES DAILY
Qty: 30 TABLET | Refills: 2 | Status: SHIPPED | OUTPATIENT
Start: 2023-01-26 | End: 2023-02-25

## 2023-01-26 RX ORDER — LORAZEPAM 0.5 MG/1
0.25 TABLET ORAL
Qty: 7 TABLET | Refills: 0 | Status: SHIPPED | OUTPATIENT
Start: 2023-01-26

## 2023-01-26 NOTE — PROGRESS NOTES
[facility]      Chief Complaint:     Chief Complaint   Patient presents with    Depression    Anxiety    Other     Patient states took a THC edible and has been giving her problems for the past two weeks. Yulia Beatty is a 32 y.o. female that presents for: follow up      Assessment/Plan:     Diagnoses and all orders for this visit:    1. Anxiety: Baseline anxiety, worsened after panic attacks induced by eating an edible 2 weeks ago. Slowly improving. Did have some hallucinations during last panic attack-- discussed Seroquel vs Buspar (staying away from SSRIs) because she wants a daily medicine for anxiety. Giving Ativan for panic attacks, advised I do not prescribe this long term  -     busPIRone (BUSPAR) 5 mg tablet; Take 1 Tablet by mouth two (2) times a day for 30 days.  -     LORazepam (ATIVAN) 0.5 mg tablet; Take 0.5 Tablets by mouth daily as needed for Anxiety for up to 7 doses. 2. Panic attacks  -     busPIRone (BUSPAR) 5 mg tablet; Take 1 Tablet by mouth two (2) times a day for 30 days.  -     LORazepam (ATIVAN) 0.5 mg tablet; Take 0.5 Tablets by mouth daily as needed for Anxiety for up to 7 doses. Follow up: Follow-up and Dispositions    Return in about 2 weeks (around 2/9/2023) for follow up panic attacks. Subjective:   HPI:  Yulia Beatty is a 32 y.o. female that presents for:    Increased anxiety since eating edible on 1/14. Initially called poison control, was told 80% would be out of system by day 5. Seen in ED 1/17 and 1/23  Getting better every day. Has had 2 panic attacks-- increased HR, detached from reality, heavy breathing, and anxiety. Had visual and auditory hallucinations. Lasted for 3-4 hours. Got Ativan in ED. Overall feeling anxious. Has history of anxiety. Took Lexapro in the past which helped but gained weight. Not sleeping well, waking with nightmares.  No manic symptoms/ Has been seeing therapist.     Health Maintenance:  Health Maintenance Due   Topic Date Due    COVID-19 Vaccine (1) Never done    Pap Smear  06/25/2021    Flu Vaccine (1) 08/01/2022        ROS:   See HPI      Past medical history, social history, and medications personally reviewed. 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 Allergies   Allergen Reactions    Hydrocodone Nausea and Vomiting          Objective:   Vitals reviewed. Visit Vitals  /73 (BP 1 Location: Right upper arm, BP Patient Position: Sitting, BP Cuff Size: Adult)   Pulse 98   Temp 97.7 °F (36.5 °C) (Temporal)   Resp 17   Ht 5' 7\" (1.702 m)   Wt 149 lb (67.6 kg)   LMP 01/15/2023   SpO2 90%   BMI 23.34 kg/m²        Wt Readings from Last 3 Encounters:   01/26/23 149 lb (67.6 kg)   01/19/23 150 lb 12.8 oz (68.4 kg)   05/13/22 151 lb (68.5 kg)        Physical Exam  Physical Exam     Vitals: Reviewed. General: AO x 3. No distress. Not diaphoretic. No jaundice. No cyanosis. No pallor. HEENT: No thyromegaly or JVD  Cardio: Normal rate, regular rhythm. No murmur, rubs, or gallop. Pulmonary: Effort normal. No accessory muscle use. No wheezes, rales, or rhonchi. Abdominal: Soft. No rebound or guarding. No tenderness. No distension. Extremities: No edema of lower extremities. Pulses 2+. Neurological: CN II-XII grossly intact. No focal deficits. Skin: No rash. I have reviewed pertinent labs results and other data. I have discussed the diagnosis with the patient and the intended plan as seen in the above orders. The patient has received an after-visit summary and questions were answered concerning future plans. I have discussed medication side effects and warnings with the patient as well.     Andrew Bishop DO  01/26/23

## 2023-01-26 NOTE — PROGRESS NOTES
Chief Complaint   Patient presents with    Depression    Anxiety         1. \"Have you been to the ER, urgent care clinic since your last visit? Hospitalized since your last visit? \" Yes [facility]: [facility] Reason for visit: Panic attack     2. \"Have you seen or consulted any other health care providers outside of the [address] [place] [address] since your last visit? \" No     3. For patients aged 39-70: Has the patient had a colonoscopy / FIT/ Cologuard? NA - based on age      If the patient is female:    4. For patients aged 41-77: Has the patient had a mammogram within the past 2 years? NA - based on age or sex      11. For patients aged 21-65: Has the patient had a pap smear?  NA - based on age or sex         3 most recent PHQ Screens 1/19/2023   PHQ Not Done -   Little interest or pleasure in doing things Not at all   Feeling down, depressed, irritable, or hopeless Not at all   Total Score PHQ 2 0   Trouble falling or staying asleep, or sleeping too much Not at all   Feeling tired or having little energy Not at all   Poor appetite, weight loss, or overeating Several days   Feeling bad about yourself - or that you are a failure or have let yourself or your family down Not at all   Trouble concentrating on things such as school, work, reading, or watching TV Several days   Moving or speaking so slowly that other people could have noticed; or the opposite being so fidgety that others notice Not at all   Thoughts of being better off dead, or hurting yourself in some way Not at all   PHQ 9 Score 2   How difficult have these problems made it for you to do your work, take care of your home and get along with others Somewhat difficult       Health Maintenance Due   Topic Date Due    COVID-19 Vaccine (1) Never done    Pap Smear  06/25/2021    Flu Vaccine (1) 08/01/2022

## 2023-02-04 ENCOUNTER — HOSPITAL ENCOUNTER (EMERGENCY)
Age: 28
Discharge: HOME | End: 2023-02-04
Attending: STUDENT IN AN ORGANIZED HEALTH CARE EDUCATION/TRAINING PROGRAM
Payer: COMMERCIAL

## 2023-02-04 ENCOUNTER — APPOINTMENT (OUTPATIENT)
Dept: CT IMAGING | Age: 28
End: 2023-02-04
Attending: STUDENT IN AN ORGANIZED HEALTH CARE EDUCATION/TRAINING PROGRAM
Payer: COMMERCIAL

## 2023-02-04 VITALS
HEART RATE: 97 BPM | WEIGHT: 152.12 LBS | TEMPERATURE: 97.9 F | RESPIRATION RATE: 18 BRPM | OXYGEN SATURATION: 97 % | BODY MASS INDEX: 23.82 KG/M2 | SYSTOLIC BLOOD PRESSURE: 147 MMHG | DIASTOLIC BLOOD PRESSURE: 93 MMHG

## 2023-02-04 DIAGNOSIS — R20.0 NUMBNESS AND TINGLING: Primary | ICD-10-CM

## 2023-02-04 DIAGNOSIS — F41.1 ANXIETY, GENERALIZED: ICD-10-CM

## 2023-02-04 DIAGNOSIS — R20.2 NUMBNESS AND TINGLING: Primary | ICD-10-CM

## 2023-02-04 LAB
ALBUMIN SERPL-MCNC: 3.5 G/DL (ref 3.5–5)
ALBUMIN/GLOB SERPL: 0.9 (ref 1.1–2.2)
ALP SERPL-CCNC: 43 U/L (ref 45–117)
ALT SERPL-CCNC: 19 U/L (ref 12–78)
AMPHET UR QL SCN: NEGATIVE
ANION GAP SERPL CALC-SCNC: 6 MMOL/L (ref 5–15)
APPEARANCE UR: CLEAR
AST SERPL-CCNC: 10 U/L (ref 15–37)
BACTERIA URNS QL MICRO: NEGATIVE /HPF
BARBITURATES UR QL SCN: NEGATIVE
BASOPHILS # BLD: 0.1 K/UL (ref 0–0.1)
BASOPHILS NFR BLD: 1 % (ref 0–1)
BENZODIAZ UR QL: NEGATIVE
BILIRUB SERPL-MCNC: 0.2 MG/DL (ref 0.2–1)
BILIRUB UR QL: NEGATIVE
BUN SERPL-MCNC: 11 MG/DL (ref 6–20)
BUN/CREAT SERPL: 12 (ref 12–20)
CALCIUM SERPL-MCNC: 9.5 MG/DL (ref 8.5–10.1)
CANNABINOIDS UR QL SCN: NEGATIVE
CHLORIDE SERPL-SCNC: 108 MMOL/L (ref 97–108)
CO2 SERPL-SCNC: 24 MMOL/L (ref 21–32)
COCAINE UR QL SCN: NEGATIVE
COLOR UR: NORMAL
COMMENT, HOLDF: NORMAL
CREAT SERPL-MCNC: 0.95 MG/DL (ref 0.55–1.02)
DIFFERENTIAL METHOD BLD: NORMAL
DRUG SCRN COMMENT,DRGCM: NORMAL
EOSINOPHIL # BLD: 0 K/UL (ref 0–0.4)
EOSINOPHIL NFR BLD: 0 % (ref 0–7)
EPITH CASTS URNS QL MICRO: NORMAL /LPF
ERYTHROCYTE [DISTWIDTH] IN BLOOD BY AUTOMATED COUNT: 11.8 % (ref 11.5–14.5)
GLOBULIN SER CALC-MCNC: 3.7 G/DL (ref 2–4)
GLUCOSE SERPL-MCNC: 169 MG/DL (ref 65–100)
GLUCOSE UR STRIP.AUTO-MCNC: NEGATIVE MG/DL
HCG UR QL: NEGATIVE
HCT VFR BLD AUTO: 39.7 % (ref 35–47)
HGB BLD-MCNC: 13.8 G/DL (ref 11.5–16)
HGB UR QL STRIP: NEGATIVE
HYALINE CASTS URNS QL MICRO: NORMAL /LPF (ref 0–5)
IMM GRANULOCYTES # BLD AUTO: 0 K/UL (ref 0–0.04)
IMM GRANULOCYTES NFR BLD AUTO: 0 % (ref 0–0.5)
KETONES UR QL STRIP.AUTO: NEGATIVE MG/DL
LEUKOCYTE ESTERASE UR QL STRIP.AUTO: NEGATIVE
LYMPHOCYTES # BLD: 2.7 K/UL (ref 0.8–3.5)
LYMPHOCYTES NFR BLD: 33 % (ref 12–49)
MAGNESIUM SERPL-MCNC: 2 MG/DL (ref 1.6–2.4)
MCH RBC QN AUTO: 31.2 PG (ref 26–34)
MCHC RBC AUTO-ENTMCNC: 34.8 G/DL (ref 30–36.5)
MCV RBC AUTO: 89.6 FL (ref 80–99)
METHADONE UR QL: NEGATIVE
MONOCYTES # BLD: 0.4 K/UL (ref 0–1)
MONOCYTES NFR BLD: 6 % (ref 5–13)
NEUTS SEG # BLD: 4.9 K/UL (ref 1.8–8)
NEUTS SEG NFR BLD: 60 % (ref 32–75)
NITRITE UR QL STRIP.AUTO: NEGATIVE
NRBC # BLD: 0 K/UL (ref 0–0.01)
NRBC BLD-RTO: 0 PER 100 WBC
OPIATES UR QL: NEGATIVE
PCP UR QL: NEGATIVE
PH UR STRIP: 6 (ref 5–8)
PLATELET # BLD AUTO: 321 K/UL (ref 150–400)
PMV BLD AUTO: 8.9 FL (ref 8.9–12.9)
POTASSIUM SERPL-SCNC: 3.6 MMOL/L (ref 3.5–5.1)
PROT SERPL-MCNC: 7.2 G/DL (ref 6.4–8.2)
PROT UR STRIP-MCNC: NEGATIVE MG/DL
RBC # BLD AUTO: 4.43 M/UL (ref 3.8–5.2)
RBC #/AREA URNS HPF: NORMAL /HPF (ref 0–5)
SAMPLES BEING HELD,HOLD: NORMAL
SODIUM SERPL-SCNC: 138 MMOL/L (ref 136–145)
SP GR UR REFRACTOMETRY: 1.01 (ref 1–1.03)
UR CULT HOLD, URHOLD: NORMAL
UROBILINOGEN UR QL STRIP.AUTO: 0.2 EU/DL (ref 0.2–1)
WBC # BLD AUTO: 8.1 K/UL (ref 3.6–11)
WBC URNS QL MICRO: NORMAL /HPF (ref 0–4)

## 2023-02-04 PROCEDURE — 99284 EMERGENCY DEPT VISIT MOD MDM: CPT

## 2023-02-04 PROCEDURE — 81001 URINALYSIS AUTO W/SCOPE: CPT

## 2023-02-04 PROCEDURE — 83735 ASSAY OF MAGNESIUM: CPT

## 2023-02-04 PROCEDURE — 80307 DRUG TEST PRSMV CHEM ANLYZR: CPT

## 2023-02-04 PROCEDURE — 90791 PSYCH DIAGNOSTIC EVALUATION: CPT

## 2023-02-04 PROCEDURE — 85025 COMPLETE CBC W/AUTO DIFF WBC: CPT

## 2023-02-04 PROCEDURE — 70450 CT HEAD/BRAIN W/O DYE: CPT

## 2023-02-04 PROCEDURE — 81025 URINE PREGNANCY TEST: CPT

## 2023-02-04 PROCEDURE — 36415 COLL VENOUS BLD VENIPUNCTURE: CPT

## 2023-02-04 PROCEDURE — 80053 COMPREHEN METABOLIC PANEL: CPT

## 2023-02-04 RX ORDER — LORAZEPAM 0.5 MG/1
0.5 TABLET ORAL
Qty: 7 TABLET | Refills: 0 | Status: SHIPPED | OUTPATIENT
Start: 2023-02-04

## 2023-02-05 NOTE — ED TRIAGE NOTES
Took a delta-9 edible x3wks ago, states \"I have not felt right since\". C/o numbness all over body, \"my toes are numb\", frequent urination, numb tongue, feels like something is stuck in throat after eating. Seen at [facility] 3 days after w/ negative work up and then 6 days later for further testing. Pt states numbness is progressively getting worse since first being see. Pt c/o change in sensation in hands and \"more out of it\". Pt speaking full and clear sentences and ambulating without any difficulty, no obvious facial droop or weakness. Pt admits to passive SI thoughts but denies plan or action.

## 2023-02-05 NOTE — BSMART NOTE
Comprehensive Assessment Form Part 1    Section I - Disposition    Diagnosis: Unspecified Anxiety d/o                      Reaction to Severe stress      The Medical Doctor to Psychiatrist conference was not completed. The Medical Doctor is in agreement with Psychiatrist disposition because of (reason) patient doesn't warrant inpatient care. The plan is d/c home and follow up with current therapist and PCP next week. Her sister is staying the night with her. Recommended to patient to begin daily self care and grounding exercises to help bring overwhelm down. Educated around psychodelic nature of Delta 9 and how the \"bad trip\" seems to have created a trauma response in her body(hypervigilance, disconnect from self, inability to focus, sense of numbness, high emotion arousal at remembering trip). She will need to work with her therapist in combination with Rx. Given patient had increased wakefulness, vivid dreams, and disturbing thoughts during the night on Hydroxyzine, she now gets relief from the Ativan. Educated that Buspar needs more time to take effect. May benefit from low dose Klonopin vs Ativan to avoid breakthrough anxiety at night. Offered resources for Psychiatry. Suggested patient explore more with her therapist the events/change in mood and behavior that were pre-existing the \"bad trip\" as this may give her more insight into her current state(given they were impulsive and didn't feel like her). The on-call Psychiatrist consulted was NA  The admitting Psychiatrist will be Dr. Antoni Velasquez. The admitting Diagnosis is NA. Section II - Integrated Summary  Summary:  Patient presents to ER after several more restless and uncomfortable days. Per Triage Note: \"Took a delta-9 edible x3wks ago, states \"I have not felt right since\". C/o numbness all over body, \"my toes are numb\", frequent urination, numb tongue, feels like something is stuck in throat after eating.  Seen at [facility] EM[facility] 3 days after w/ negative work up and then 6 days later for further testing. Pt states numbness is progressively getting worse since first being see. Pt c/o change in sensation in hands and \"more out of it\". \" Patient continues to feel very different and talks about feeling like she's not in her body, numbness in extremities, waves of anxiety/tearfulness, inability to concentrate, and \"I just start thinking like something is wrong with me that I won't ever recover from\". All medical tests have been negative, and patient can objectively state that she is physically ok. Shared that her experience of taking the Delta 9 gummy was a \"horrific experience-I was hallucinating and scared-I thought people were going to kill me\". This lasted for 5hrs before patient felt some part of her return to coherent state. She continued to hallucinate off/on for 4 days afterwards. She has struggled to eat and has lost weight. Her sleep is disrupted by nightmares and racing negative thoughts. When asked about her mood/anxiety prior to ingestion of Delta 9, patient hesitantly talks about how \"I just wasn't acting like myself\". She states that in the late fall she began to notice that she was having a harder time focusing at work, seemed to \"just be like a robot doing my job\" at times, and began having \"intimacy issues\" in her marriage. Patient goes on to state that she found herself overmaking plans with friends so that she was gone a lot(which isn't like her) and then wound up meeting another male with whom she had an affair for 1 month. She tearfully talks about the guilt she felt/feels and how she ended it but her anxiety escalated more \"that's the whole reason I even asked for the delta 9 because he takes it to sleep\". Her \"other\" male gets this product regularly and takes 1 nightly, so she and her female friend each took 1/2 \"and she was all relaxed like he was-he got so pissed off at me saying that I was crazy\".  Patient says that she can't explain or understand why she's made the choices she has over the past few months and holds major regrets. Tonight while waiting for behavioral health, patient said she had to tell her  the truth about the affair and did so. He was understandably angry and hurt-he left to go to his parents for the evening. The patient is deemed competent to provide informed consent. The information is given by the patient. The Chief Complaint is \"I'm stupid and this is embarrassing but I can't get myself together\". The Precipitant Factors are increased anxiety, impulsive behavior, Detla 9 ingestion. Previous Hospitalizations: none  The patient has not previously been in restraints. Current Psychiatrist and/or  is Asael Delarosa LCSW    Lethality Assessment:    The potential for suicide is noted by the following: noted by the following;  ideation. The potential for homicide is not noted. The patient has not been a perpetrator of sexual or physical abuse. There are not pending charges. The patient is not felt to be at risk for self harm or harm to others. The attending nurse was advised . Section III - Psychosocial  The patient's overall mood and attitude is anxious but cooperative. Feelings of helplessness and hopelessness are not observed. Generalized anxiety is observed by patient reports high anxiety before Delta 9 and since. Panic is observed by patient report. Phobias are not observed. Obsessive compulsive tendencies are not observed. Section IV - Mental Status Exam  The patient's appearance shows no evidence of impairment. The patient's behavior is restless. The patient is oriented to time, place, person and situation. The patient's speech shows no evidence of impairment. The patient's mood  is depressed and is anxious. The range of affect shows no evidence of impairment. The patient's thought content  demonstrates no evidence of impairment. The thought process perseveration.   The patient's perception shows no evidence of impairment. The patient's memory is impaired. The patient's appetite is decreased and shows signs of weight loss. The patient's sleep has evidence of insomnia. The patient's insight shows no evidence of impairment. The patient's judgement shows no evidence of impairment. Section V - Substance Abuse  The patient is not using substances. The patient has tried Cannabis but says that she's never been a regular user. Section VI - Living Arrangements  The patient is . The spouses approximate age is 27 and appears to be in good health. The patient lives with a spouse. The patient has no children. The patient does plan to return home upon discharge. The patient does not have legal issues pending. The patient's source of income comes from employment. Pentecostal and cultural practices have not been voiced at this time. The patient's greatest support comes from  and sister and this person will be involved with the treatment. The patient has been in an event described as horrible or outside the realm of ordinary life experience either currently or in the past.  It is unknown if the patient has been a victim of sexual/physical abuse. Section VII - Other Areas of Clinical Concern  The highest grade achieved is unk with the overall quality of school experience being described as unk. The patient is currently  employed and speaks [place] as a primary language. The patient has no communication impairments affecting communication. The patient's preference for learning can be described as: can read and write adequately.   The patient's hearing is normal.  The patient's vision is normal .      Malinda Bhandari

## 2023-02-05 NOTE — BSMART NOTE
BSMART assessment completed, and suicide risk level noted to be Low. Primary Nurse Ethan Orourke notified who notified PA. Concerns not observed. Security/Off- has not been notified.

## 2023-02-05 NOTE — ED PROVIDER NOTES
32 y.o. female with history of anxiety and migraines presents to ED with anxiety, numbness and vision changes. Patient reports that on 01/14 she eats a delta 9 gummy with 50 mg of THC and 10 mg of CBD and reports that since then \"I have just not felt right\". She reports several days after eating this coming she started noticing that she had generalized numbness and tingling, blurred vision, frequent urination and GI issues with alternating constipation and diarrhea. She also notes feeling like her hearing is \"muffled\". She initially went to [facility] ED about 3 days after ingestion where they did blood work but no imaging and told her everything looks good. She returned 9 days after ingestion where she was given Ativan and discharged again. She saw her PCP as follow-up spoke time after the ED. Initially her PCP prescribed her with hydroxyzine which she reports just \"gave her weird dreams\" and then most recently prescribed her Ativan 0.5 mg to take as needed which she has been taking nightly to help her sleep and BuSpar. She reports that her symptoms have not improved and it is so unbearable that she is starting to have feelings of hurting herself. She denies any plan, homicidal ideation, auditory or visual hallucinations but reports that she has never had thoughts like this before. She is concerned something may be wrong and would like to be evaluated again. She denies any fevers, chills, dysuria, vomiting, chest pain, shortness of breath. Otherwise reports that she has no drug history, no tobacco use and no alcohol use. The history is provided by the patient. Numbness  Pertinent negatives include no shortness of breath, no chest pain, no vomiting, no headaches and no nausea.       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 Family History:   Problem Relation Age of Onset    Diabetes Mother     Hypertension Mother     High Cholesterol Father     No Known Problems Sister     Breast Cancer Maternal Grandmother     Alzheimer's Disease Paternal Grandmother     Alzheimer's Disease Paternal Grandfather        Social History     Socioeconomic History    Marital status:      Spouse name: Not on file    Number of children: Not on file    Years of education: Not on file    Highest education level: Not on file   Occupational History    Not on file   Tobacco Use    Smoking status: Never     Passive exposure: Past    Smokeless tobacco: Never   Vaping Use    Vaping Use: Never used   Substance and Sexual Activity    Alcohol use: Yes    Drug use: No    Sexual activity: Yes     Partners: Male   Other Topics Concern    Not on file   Social History Narrative    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     Exercise: 3-4 days per week. Ashly Soni. Social Determinants of Health     Financial Resource Strain: Low Risk     Difficulty of Paying Living Expenses: Not hard at all   Food Insecurity: No Food Insecurity    Worried About Running Out of Food in the Last Year: Never true    Ran Out of Food in the Last Year: Never true   Transportation Needs: Not on file   Physical Activity: Not on file   Stress: Not on file   Social Connections: Not on file   Intimate Partner Violence: Not on file   Housing Stability: Not on file         ALLERGIES: Hydrocodone    Review of Systems   Constitutional:  Negative for fever. HENT:  Negative for congestion and sinus pressure. Eyes:  Positive for visual disturbance. Respiratory:  Negative for shortness of breath. Cardiovascular:  Negative for chest pain. Gastrointestinal:  Negative for nausea and vomiting. Genitourinary:  Negative for dysuria. Musculoskeletal:  Negative for myalgias. Neurological:  Positive for numbness.  Negative for dizziness and headaches. Hematological:  Negative for adenopathy. Psychiatric/Behavioral:  The patient is not nervous/anxious. All other systems reviewed and are negative. Vitals:    02/04/23 1920   BP: (!) 147/93   Pulse: 97   Resp: 18   Temp: 97.9 °F (36.6 °C)   SpO2: 97%   Weight: 69 kg (152 lb 1.9 oz)            Physical Exam  Vitals and nursing note reviewed. Constitutional:       General: She is not in acute distress. Appearance: Normal appearance. She is normal weight. HENT:      Head: Normocephalic and atraumatic. Eyes:      Extraocular Movements: Extraocular movements intact. Pupils: Pupils are equal, round, and reactive to light. Cardiovascular:      Rate and Rhythm: Normal rate and regular rhythm. Heart sounds: Normal heart sounds. Pulmonary:      Breath sounds: Normal breath sounds. Abdominal:      Palpations: Abdomen is soft. Tenderness: There is no abdominal tenderness. Lymphadenopathy:      Cervical: No cervical adenopathy. Skin:     General: Skin is warm and dry. Neurological:      General: No focal deficit present. Mental Status: She is alert and oriented to person, place, and time. Cranial Nerves: No cranial nerve deficit. Gait: Gait normal.   Psychiatric:         Mood and Affect: Mood normal.         Behavior: Behavior normal.         Thought Content: Thought content normal.        Medical Decision Making  32 y.o. female with history of anxiety and migraines presents to ED with anxiety, numbness and vision changes. Vital signs revealed patient afebrile with stable vital signs otherwise in triage. Physical exam notable for cranial nerves intact bilaterally with no focal neurodeficits and normal gait. Labs revealed no acute abnormalities with no indication of urinary tract infection, no elevation white blood cell count and negative urine drug screen. Imaging included CT of head which showed no acute abnormalities.   [facility] consulted with patient and gave her resources but did not feel like she met admission criteria at this time. No significant socioeconomic barriers to care identified by patient during this visit. Had discussions with supervising physician and [facility] professional team regarding the care of this patient. Discussed findings and reasoning with patient and family members who verbalized understanding. Patient will be discharged with refill of 0.5 mg of Ativan as she does not have a follow-up appointment with her PCP for a couple weeks. Patient will also be discharged with instructions for conservative care, follow up and return precautions. This has all been discussed at length with patient and family members. Amount and/or Complexity of Data Reviewed  Labs: ordered. Radiology: ordered. Risk  Prescription drug management. ED Course as of 02/04/23 2202   Sat Feb 04, 2023 2102 CT of head showed no acute abnormalities.  [AH]      ED Course User Index  [AH] Lui Vera       Procedures

## 2023-02-08 ENCOUNTER — TELEPHONE (OUTPATIENT)
Dept: FAMILY MEDICINE CLINIC | Age: 28
End: 2023-02-08

## 2023-02-08 DIAGNOSIS — F41.0 PANIC ATTACKS: ICD-10-CM

## 2023-02-08 DIAGNOSIS — F41.9 ANXIETY: Primary | ICD-10-CM

## 2023-02-08 RX ORDER — ESCITALOPRAM OXALATE 5 MG/1
5 TABLET ORAL DAILY
Qty: 30 TABLET | Refills: 0 | Status: SHIPPED | OUTPATIENT
Start: 2023-02-08 | End: 2023-03-10

## 2023-02-08 NOTE — TELEPHONE ENCOUNTER
Called patients to discuss side effects of Buspar. Has been taking it for about 2 weeks and has had worsening numbness in hands and feet and tremors, it is also not helping with anxiety well. Has been on Lexapro in the past which helped with anxiety but caused weight gain. Discussed the pros and cons of restarting Lexapro and we decided to try it again. I've explained to her that drugs of the SSRI class can have side effects such as weight gain, sexual dysfunction, insomnia, headache, nausea. These medications are generally effective at alleviating symptoms of anxiety and/or depression. Let me know if significant side effects do occur. Follow up with Dr. Orion Johnson in 1 week.     Federico De Leon, DO

## 2023-02-10 ENCOUNTER — HOSPITAL ENCOUNTER (EMERGENCY)
Age: 28
Discharge: HOME | End: 2023-02-11
Attending: EMERGENCY MEDICINE
Payer: COMMERCIAL

## 2023-02-10 DIAGNOSIS — R20.2 NUMBNESS AND TINGLING: Primary | ICD-10-CM

## 2023-02-10 DIAGNOSIS — R51.9 PERSISTENT HEADACHES: ICD-10-CM

## 2023-02-10 DIAGNOSIS — R20.0 NUMBNESS AND TINGLING: Primary | ICD-10-CM

## 2023-02-10 LAB
ALBUMIN SERPL-MCNC: 3.5 G/DL (ref 3.5–5)
ALBUMIN/GLOB SERPL: 0.9 (ref 1.1–2.2)
ALP SERPL-CCNC: 42 U/L (ref 45–117)
ALT SERPL-CCNC: 21 U/L (ref 12–78)
ANION GAP SERPL CALC-SCNC: 5 MMOL/L (ref 5–15)
AST SERPL-CCNC: 14 U/L (ref 15–37)
BASOPHILS # BLD: 0.1 K/UL (ref 0–0.1)
BASOPHILS NFR BLD: 1 % (ref 0–1)
BILIRUB SERPL-MCNC: 0.2 MG/DL (ref 0.2–1)
BUN SERPL-MCNC: 9 MG/DL (ref 6–20)
BUN/CREAT SERPL: 8 (ref 12–20)
CALCIUM SERPL-MCNC: 10 MG/DL (ref 8.5–10.1)
CHLORIDE SERPL-SCNC: 107 MMOL/L (ref 97–108)
CO2 SERPL-SCNC: 26 MMOL/L (ref 21–32)
COMMENT, HOLDF: NORMAL
CREAT SERPL-MCNC: 1.12 MG/DL (ref 0.55–1.02)
DIFFERENTIAL METHOD BLD: ABNORMAL
EOSINOPHIL # BLD: 0 K/UL (ref 0–0.4)
EOSINOPHIL NFR BLD: 0 % (ref 0–7)
ERYTHROCYTE [DISTWIDTH] IN BLOOD BY AUTOMATED COUNT: 11.9 % (ref 11.5–14.5)
GLOBULIN SER CALC-MCNC: 3.7 G/DL (ref 2–4)
GLUCOSE SERPL-MCNC: 107 MG/DL (ref 65–100)
HCT VFR BLD AUTO: 42.4 % (ref 35–47)
HGB BLD-MCNC: 14.8 G/DL (ref 11.5–16)
IMM GRANULOCYTES # BLD AUTO: 0 K/UL (ref 0–0.04)
IMM GRANULOCYTES NFR BLD AUTO: 0 % (ref 0–0.5)
LYMPHOCYTES # BLD: 2.6 K/UL (ref 0.8–3.5)
LYMPHOCYTES NFR BLD: 32 % (ref 12–49)
MAGNESIUM SERPL-MCNC: 2.2 MG/DL (ref 1.6–2.4)
MCH RBC QN AUTO: 30.9 PG (ref 26–34)
MCHC RBC AUTO-ENTMCNC: 34.9 G/DL (ref 30–36.5)
MCV RBC AUTO: 88.5 FL (ref 80–99)
MONOCYTES # BLD: 0.6 K/UL (ref 0–1)
MONOCYTES NFR BLD: 8 % (ref 5–13)
NEUTS SEG # BLD: 4.8 K/UL (ref 1.8–8)
NEUTS SEG NFR BLD: 59 % (ref 32–75)
NRBC # BLD: 0 K/UL (ref 0–0.01)
NRBC BLD-RTO: 0 PER 100 WBC
PLATELET # BLD AUTO: 361 K/UL (ref 150–400)
PMV BLD AUTO: 8.8 FL (ref 8.9–12.9)
POTASSIUM SERPL-SCNC: 3.9 MMOL/L (ref 3.5–5.1)
PROT SERPL-MCNC: 7.2 G/DL (ref 6.4–8.2)
RBC # BLD AUTO: 4.79 M/UL (ref 3.8–5.2)
SAMPLES BEING HELD,HOLD: NORMAL
SODIUM SERPL-SCNC: 138 MMOL/L (ref 136–145)
WBC # BLD AUTO: 8.1 K/UL (ref 3.6–11)

## 2023-02-10 PROCEDURE — 80053 COMPREHEN METABOLIC PANEL: CPT

## 2023-02-10 PROCEDURE — 99283 EMERGENCY DEPT VISIT LOW MDM: CPT

## 2023-02-10 PROCEDURE — 36415 COLL VENOUS BLD VENIPUNCTURE: CPT

## 2023-02-10 PROCEDURE — 83735 ASSAY OF MAGNESIUM: CPT

## 2023-02-10 PROCEDURE — 85025 COMPLETE CBC W/AUTO DIFF WBC: CPT

## 2023-02-10 NOTE — Clinical Note
[address]  [address] [facility] [address]  [phone]    Work/School Note    Date: 2/10/2023    To Whom It May concern:    Caro Parada was seen and treated today in the emergency room by the following provider(s):  Attending Provider: Suhail Ignacio MD  Physician Assistant: Baldev Reaves PA-C. Caro Parada is excused from work/school on 2/10/2023 through 2/12/2023. She is medically clear to return to work/school on 2/13/2023.          Sincerely,          Yanet Munoz PA-C

## 2023-02-11 VITALS
SYSTOLIC BLOOD PRESSURE: 125 MMHG | HEART RATE: 101 BPM | RESPIRATION RATE: 16 BRPM | DIASTOLIC BLOOD PRESSURE: 81 MMHG | OXYGEN SATURATION: 99 % | TEMPERATURE: 97.6 F

## 2023-02-11 LAB
APPEARANCE UR: CLEAR
BACTERIA URNS QL MICRO: NEGATIVE /HPF
BILIRUB UR QL: NEGATIVE
COLOR UR: ABNORMAL
EPITH CASTS URNS QL MICRO: ABNORMAL /LPF
GLUCOSE UR STRIP.AUTO-MCNC: NEGATIVE MG/DL
HGB UR QL STRIP: ABNORMAL
HYALINE CASTS URNS QL MICRO: ABNORMAL /LPF (ref 0–5)
KETONES UR QL STRIP.AUTO: ABNORMAL MG/DL
LEUKOCYTE ESTERASE UR QL STRIP.AUTO: NEGATIVE
NITRITE UR QL STRIP.AUTO: NEGATIVE
PH UR STRIP: 6.5 (ref 5–8)
PROT UR STRIP-MCNC: NEGATIVE MG/DL
RBC #/AREA URNS HPF: ABNORMAL /HPF (ref 0–5)
SP GR UR REFRACTOMETRY: 1.02 (ref 1–1.03)
UA: UC IF INDICATED,UAUC: ABNORMAL
UROBILINOGEN UR QL STRIP.AUTO: 0.2 EU/DL (ref 0.2–1)
WBC URNS QL MICRO: ABNORMAL /HPF (ref 0–4)

## 2023-02-11 PROCEDURE — 81001 URINALYSIS AUTO W/SCOPE: CPT

## 2023-02-11 NOTE — DISCHARGE INSTRUCTIONS
Your labs were reviewed and normal. We provided a consult with a neurologist tonight. They believe you are safe to have these issues addressed outpatient. Please call the neurologist listed to set up an appt. Return to ER for any worsening or worrisome symptoms.

## 2023-02-11 NOTE — ED TRIAGE NOTES
Patient ambulatory through triage with complaints of right lower leg numbness. She reports she was here for the same recently and was unable to get an apt with a neurologist so has come back since the loss of sensation is worsening and it is getting harder to drive and ambulate.

## 2023-02-11 NOTE — ED PROVIDER NOTES
Numbness  Pertinent negatives include no agitation. Associated symptoms include headaches. Pertinent negatives include no shortness of breath, no chest pain, no vomiting, no confusion and no nausea. Alex Goss is a 32 y.o. female with Hx of anxiety who presents ambulatory to [facility] ED with cc of right lower leg numbness. Patient reports taking half of a THC gummy approximately 1 month ago and that is when her symptoms started. Patient reports bilateral leg symptoms from feet to waist, right worse than left: muscle stiffness, painful muscle contractions, spasms, burning sensation turned now into cold sensation, complete numbness and inability to feel anything. Patient reports whole body feels numb, feeling out of it, tired. Patient reports increased migraines in the last 6 months, with a severe headache yesterday and the day before, resolved with Advil. Patient also reports intermittent diarrhea for 1 month, nonbloody. Patient notes a questionable seizure at time of taking edible, symptoms of shaking and seeing flashing lights, but event was unwitnessed. Seen in ER on 02/04/23 with a variety of symptoms including anxiety, numbness, tingling, vision changes, frequent urination, constipation, diarrhea, muffled hearing. CT head unremarkable. Discharged with refill of Ativan and PCP follow-up. Denies fever, chills, nausea, vomiting, decreased p.o. intake, URI symptoms, back pain, trauma, injury, swelling of extremities, sick contacts, hematemesis, hematochezia, melena, nausea, vomiting, abdominal pain. Patient notes recently switching from BuSpar to Lexapro last week without change in symptoms. Patient was worried about a blood clot today and took 81 mg of aspirin as well as Advil. PCP: Katey Anguiano MD    There are no other complaints, changes or physical findings at this time.        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 Family History:   Problem Relation Age of Onset    Diabetes Mother     Hypertension Mother     High Cholesterol Father     No Known Problems Sister     Breast Cancer Maternal Grandmother     Alzheimer's Disease Paternal Grandmother     Alzheimer's Disease Paternal Grandfather        Social History     Socioeconomic History    Marital status:      Spouse name: Not on file    Number of children: Not on file    Years of education: Not on file    Highest education level: Not on file   Occupational History    Not on file   Tobacco Use    Smoking status: Never     Passive exposure: Past    Smokeless tobacco: Never   Vaping Use    Vaping Use: Never used   Substance and Sexual Activity    Alcohol use: Yes    Drug use: No    Sexual activity: Yes     Partners: Male   Other Topics Concern    Not on file   Social History Narrative    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     Exercise: 3-4 days per week. Ashly Higuera. Social Determinants of Health     Financial Resource Strain: Low Risk     Difficulty of Paying Living Expenses: Not hard at all   Food Insecurity: No Food Insecurity    Worried About Running Out of Food in the Last Year: Never true    Ran Out of Food in the Last Year: Never true   Transportation Needs: Not on file   Physical Activity: Not on file   Stress: Not on file   Social Connections: Not on file   Intimate Partner Violence: Not on file   Housing Stability: Not on file         ALLERGIES: Hydrocodone    Review of Systems   Constitutional:  Negative for activity change, appetite change, chills and fever. HENT:  Negative for congestion, rhinorrhea and sore throat. Respiratory:  Negative for cough and shortness of breath. Cardiovascular:  Negative for chest pain. Gastrointestinal:  Positive for diarrhea.  Negative for abdominal pain, blood in stool, constipation, nausea and vomiting. Genitourinary:  Negative for decreased urine volume and difficulty urinating. Musculoskeletal:  Negative for arthralgias and myalgias. Skin:  Negative for color change and rash. Neurological:  Positive for weakness, numbness and headaches. Negative for light-headedness. Psychiatric/Behavioral:  Negative for agitation and confusion. The patient is not nervous/anxious. Vitals:    02/10/23 2103 02/11/23 0125   BP: 135/87 125/81   Pulse: (!) 108 (!) 101   Resp: 16    Temp: 97.6 °F (36.4 °C)    SpO2: 99% 99%            Physical Exam  Vitals and nursing note reviewed. Constitutional:       Appearance: Normal appearance. Comments: Patient ambulating without difficulty   HENT:      Head: Normocephalic and atraumatic. Right Ear: External ear normal.      Left Ear: External ear normal.      Nose: Nose normal.      Mouth/Throat:      Mouth: Mucous membranes are moist.   Eyes:      Extraocular Movements: Extraocular movements intact. Conjunctiva/sclera: Conjunctivae normal.      Pupils: Pupils are equal, round, and reactive to light. Cardiovascular:      Rate and Rhythm: Normal rate and regular rhythm. Pulses: Normal pulses. Dorsalis pedis pulses are 2+ on the right side and 2+ on the left side. Posterior tibial pulses are 2+ on the right side and 2+ on the left side. Heart sounds: Normal heart sounds. Comments: No tenderness to palpation of posterior calf or popliteal regions bilaterally. Capillary refill of bilateral toes less than 2 seconds. Pulmonary:      Effort: Pulmonary effort is normal.      Breath sounds: Normal breath sounds. Abdominal:      Palpations: Abdomen is soft. Musculoskeletal:         General: No swelling. Normal range of motion. Cervical back: Normal range of motion and neck supple. Right lower leg: No edema. Left lower leg: No edema.       Comments: Bilateral legs: Range of motion intact. Nontender to palpation. Strength intact and equal.  Neurovascularly intact. Skin:     General: Skin is warm and dry. Capillary Refill: Capillary refill takes less than 2 seconds. Comments: Bilateral legs: No erythema, edema, skin breakage. Warm to touch and equal in temperature throughout. Neurological:      General: No focal deficit present. Mental Status: She is alert and oriented to person, place, and time. Mental status is at baseline. Cranial Nerves: Cranial nerves 2-12 are intact. No facial asymmetry. Motor: Motor function is intact. Gait: Gait is intact. Comments: Patient reports decrease sensation bilaterally to legs, right worse than left. Sensation tested with light touch, firm palpation, paperclips. Two-point discrimination variable in bilateral legs, sometimes with patient unable to perceive sensation at all. Psychiatric:         Mood and Affect: Mood is anxious. Affect is tearful. Behavior: Behavior normal.         Thought Content: Thought content normal.         Judgment: Judgment normal.        Medical Decision Making  Ddx: MS, anxiety, somatic symptom disorder, Guillain-Barré, and others    20-year-old female presents with variety of symptoms, all beginning after taking THC edible approximately 1 month ago. Symptoms most concerning to her today or complete numbness in bilateral legs and fear of loss of blood flow. On exam, pulses palpable and equal, skin warm and pink, capillary refill appropriate, range of motion intact, strength intact, sensation decreased. Low suspicion for DVT, Wells 0. Discussed with attending Dr. Mark Leahy who recommended blood work and consult to tele-neurology. Considered MS as patient is in the demographic and history of vague neurological symptoms. Patient also has history of anxiety and somatic symptom disorder is also a possibility.   Considered Guillain-Barré but unlikely given 1 month of symptoms and absence of muscle weakness. Per tele-neurology, no need for inpatient work-up and patient is safe to complete work-up outpatient for evaluation of the symptoms. Discharged with neurology referral, PCP follow-up, strict return precautions. Amount and/or Complexity of Data Reviewed  Labs: ordered. ED Course as of 02/11/23 0138   Fri Feb 10, 2023   2244 Tele-neuro does not feel she warrants emergent mri or admit as long as she is ambulatory. Very unlikely to be MS or central cause based on symptoms given. [JM]      ED Course User Index  [JM] Dariela Sullivan MD       Procedures      LABORATORY TESTS:  Recent Results (from the past 12 hour(s))   CBC WITH AUTOMATED DIFF    Collection Time: 02/10/23 10:45 PM   Result Value Ref Range    WBC 8.1 3.6 - 11.0 K/uL    RBC 4.79 3.80 - 5.20 M/uL    HGB 14.8 11.5 - 16.0 g/dL    HCT 42.4 35.0 - 47.0 %    MCV 88.5 80.0 - 99.0 FL    MCH 30.9 26.0 - 34.0 PG    MCHC 34.9 30.0 - 36.5 g/dL    RDW 11.9 11.5 - 14.5 %    PLATELET 432 514 - 181 K/uL    MPV 8.8 (L) 8.9 - 12.9 FL    NRBC 0.0 0  WBC    ABSOLUTE NRBC 0.00 0.00 - 0.01 K/uL    NEUTROPHILS 59 32 - 75 %    LYMPHOCYTES 32 12 - 49 %    MONOCYTES 8 5 - 13 %    EOSINOPHILS 0 0 - 7 %    BASOPHILS 1 0 - 1 %    IMMATURE GRANULOCYTES 0 0.0 - 0.5 %    ABS. NEUTROPHILS 4.8 1.8 - 8.0 K/UL    ABS. LYMPHOCYTES 2.6 0.8 - 3.5 K/UL    ABS. MONOCYTES 0.6 0.0 - 1.0 K/UL    ABS. EOSINOPHILS 0.0 0.0 - 0.4 K/UL    ABS. BASOPHILS 0.1 0.0 - 0.1 K/UL    ABS. IMM.  GRANS. 0.0 0.00 - 0.04 K/UL    DF AUTOMATED     METABOLIC PANEL, COMPREHENSIVE    Collection Time: 02/10/23 10:45 PM   Result Value Ref Range    Sodium 138 136 - 145 mmol/L    Potassium 3.9 3.5 - 5.1 mmol/L    Chloride 107 97 - 108 mmol/L    CO2 26 21 - 32 mmol/L    Anion gap 5 5 - 15 mmol/L    Glucose 107 (H) 65 - 100 mg/dL    BUN 9 6 - 20 MG/DL    Creatinine 1.12 (H) 0.55 - 1.02 MG/DL    BUN/Creatinine ratio 8 (L) 12 - 20      eGFR >60 >60 ml/min/1.73m2    Calcium 10.0 8.5 - 10.1 MG/DL    Bilirubin, total 0.2 0.2 - 1.0 MG/DL    ALT (SGPT) 21 12 - 78 U/L    AST (SGOT) 14 (L) 15 - 37 U/L    Alk. phosphatase 42 (L) 45 - 117 U/L    Protein, total 7.2 6.4 - 8.2 g/dL    Albumin 3.5 3.5 - 5.0 g/dL    Globulin 3.7 2.0 - 4.0 g/dL    A-G Ratio 0.9 (L) 1.1 - 2.2     MAGNESIUM    Collection Time: 02/10/23 10:45 PM   Result Value Ref Range    Magnesium 2.2 1.6 - 2.4 mg/dL   SAMPLES BEING HELD    Collection Time: 02/10/23 10:45 PM   Result Value Ref Range    SAMPLES BEING HELD 1RED, BLUE     COMMENT        Add-on orders for these samples will be processed based on acceptable specimen integrity and analyte stability, which may vary by analyte. URINALYSIS W/ REFLEX CULTURE    Collection Time: 02/11/23 12:23 AM    Specimen: Urine   Result Value Ref Range    Color YELLOW/STRAW      Appearance CLEAR CLEAR      Specific gravity 1.021 1.003 - 1.030      pH (UA) 6.5 5.0 - 8.0      Protein Negative NEG mg/dL    Glucose Negative NEG mg/dL    Ketone TRACE (A) NEG mg/dL    Bilirubin Negative NEG      Blood LARGE (A) NEG      Urobilinogen 0.2 0.2 - 1.0 EU/dL    Nitrites Negative NEG      Leukocyte Esterase Negative NEG      UA:UC IF INDICATED CULTURE NOT INDICATED BY UA RESULT CNI      WBC 0-4 0 - 4 /hpf    RBC 10-20 0 - 5 /hpf    Epithelial cells FEW FEW /lpf    Bacteria Negative NEG /hpf    Hyaline cast 0-2 0 - 5 /lpf       IMAGING RESULTS:  No orders to display       MEDICATIONS GIVEN:  Medications - No data to display    IMPRESSION:  1. Numbness and tingling    2. Persistent headaches        PLAN:  1. Discharge Medication List as of 2/10/2023 11:41 PM        2.    Follow-up Information       Follow up With Specialties Details Why Contact Eric Gao DO Neurology Schedule an appointment as soon as possible for a visit in 1 week For re-evaluation; for neurology follow-up [address] [place] [address] [address]      [address], [facility] Emergency Medicine Go to  As needed, If symptoms worsen Elyssa Lee  [phone]          3. Return to ED if worse     Presentation, management, and disposition were discussed with the attending physician, Dr. Mariajose Worrell, who is in agreement with plan of care. Patient is an unexpected return, and the attending will see the patient.

## 2023-02-13 ENCOUNTER — OFFICE VISIT (OUTPATIENT)
Dept: NEUROLOGY | Age: 28
End: 2023-02-13
Payer: COMMERCIAL

## 2023-02-13 VITALS
HEART RATE: 112 BPM | BODY MASS INDEX: 22.76 KG/M2 | OXYGEN SATURATION: 100 % | DIASTOLIC BLOOD PRESSURE: 80 MMHG | HEIGHT: 67 IN | WEIGHT: 145 LBS | SYSTOLIC BLOOD PRESSURE: 118 MMHG

## 2023-02-13 DIAGNOSIS — R20.2 PARESTHESIA: Primary | ICD-10-CM

## 2023-02-13 DIAGNOSIS — G43.109 MIGRAINE WITH AURA AND WITHOUT STATUS MIGRAINOSUS, NOT INTRACTABLE: ICD-10-CM

## 2023-02-13 DIAGNOSIS — R29.2 HYPERREFLEXIA: ICD-10-CM

## 2023-02-13 PROCEDURE — 99205 OFFICE O/P NEW HI 60 MIN: CPT | Performed by: PSYCHIATRY & NEUROLOGY

## 2023-02-13 RX ORDER — NORTRIPTYLINE HYDROCHLORIDE 25 MG/1
25 CAPSULE ORAL
Qty: 30 CAPSULE | Refills: 2 | Status: SHIPPED | OUTPATIENT
Start: 2023-02-13

## 2023-02-13 NOTE — PROGRESS NOTES
NEUROLOGY  NEW PATIENT EVALUATION/CONSULTATION       PATIENT NAME: Carlos Acuna    MRN: [id number]    REASON FOR CONSULTATION: Numbness/burning    02/13/23      Previous records (physician notes, laboratory reports, and radiology reports) and imaging studies were reviewed and summarized. My recommendations will be communicated back to the patient's physician(s) via electronic medical record and/or by [address] mail. HISTORY OF PRESENT ILLNESS:  Carlos Acuna is a 32 y.o.  female presenting for evaluation of numbness/burning. She states she took a THC gummy 1 month ago just prior to symptom onset. Since this time, she has noted numbness which appears generalized but more so in her hands/feet R>L. Symptoms are intermittent with occasional coldness/burning. She feels symptoms are worsening over time. She has recently experienced more prominent numbness into her RLE resulting in recent ED evaluation. After exercises, she experiences rather intense muscle spasms/cramping. She reports slight weakness into the arms/legs symmetrically. She has also noted urinary frequency over the same time period. No gait instability, lower back pain or cervicalgia. She notes a h/o migraines and reports increasing frequency over the past 3 months. She reports 1 headache weekly over the past month, more mild without migrainous features. She has also noted rather frequent visual aura over the past month described as flashing lights and occasional shadows. FHx notable for CMT (father, paternal GF, paternal aunt). Denies preceding illness/vaccination. She has recently started Lexapro for anxiety which has also started over the past month. 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 No pertinent surgical history.     FAMILY HISTORY:   Family History   Problem Relation Age of Onset    Diabetes Mother     Hypertension Mother     High Cholesterol Father     No Known Problems Sister     Breast Cancer Maternal Grandmother     Alzheimer's Disease Paternal Grandmother     Alzheimer's Disease Paternal Grandfather          SOCIAL HISTORY:  Social History     Socioeconomic History    Marital status:    Tobacco Use    Smoking status: Never     Passive exposure: Past    Smokeless tobacco: Never   Vaping Use    Vaping Use: Never used   Substance and Sexual Activity    Alcohol use: Not Currently    Drug use: Yes     Types: Marijuana     Comment: took one gummy one month ago    Sexual activity: Yes     Partners: Male   Social History Narrative    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     Exercise: 3-4 days per week. Ashly Douglas. Social Determinants of Health     Financial Resource Strain: Low Risk     Difficulty of Paying Living Expenses: Not hard at all   Food Insecurity: No Food Insecurity    Worried About [address] [employer] in the Last Year: Never true    Ran Out of Food in the Last Year: Never true         MEDICATIONS:   Current Outpatient Medications   Medication Sig Dispense Refill    escitalopram oxalate (LEXAPRO) 5 mg tablet Take 1 Tablet by mouth daily for 30 days. 30 Tablet 0    SUMAtriptan (IMITREX) 100 mg tablet TAKE 1 TABLET BY MOUTH ONCE AS NEEDED FOR MIGRAINE FOR UP TO 1 DOSE. 9 Tablet 3    Sia, 28, 3-0.02 mg tab Take 1 Tablet by mouth daily. ascorbic acid, vitamin C, (VITAMIN C) 250 mg tablet Take  by mouth. Biotin 2,500 mcg cap Take  by mouth.      multivitamin (ONE A DAY) tablet Take 1 Tab by mouth daily. LORazepam (ATIVAN) 0.5 mg tablet Take 1 Tablet by mouth every eight (8) hours as needed for Anxiety.  Max Daily Amount: 1.5 mg. 7 Tablet 0         ALLERGIES:  Allergies   Allergen Reactions    Hydrocodone Nausea and Vomiting         REVIEW OF SYSTEMS:  10 point ROS reviewed with patient. Please see scanned document under media. PHYSICAL EXAM:  Vital Signs: Visit Vitals  /80   Pulse (!) 112   Ht 5' 7\" (1.702 m)   Wt 145 lb (65.8 kg)   LMP 01/15/2023   SpO2 100%   BMI 22.71 kg/m²        General Medical Exam:  General:  Well appearing, comfortable, in no apparent distress. Eyes/ENT: see cranial nerve examination. Neck: No masses appreciated. Full range of motion without tenderness. Respiratory:  Clear to auscultation, good air entry bilaterally. Cardiac:  Regular rate and rhythm, no murmur. GI:  Soft, non-tender, non-distended abdomen. Bowel sounds normal. No masses, organomegaly. Extremities:  No deformities, edema, or skin discoloration. Skin:  No rashes or lesions. Neurological:  Mental Status:  Alert and oriented to person, place, and time with fluent speech. Cranial Nerves:   CNII/III/IV/VI: Optic disc w/clear margins b/l, visual fields full to confrontation, EOMI, PERRL, no ptosis or nystagmus. CN V: Facial sensation intact bilaterally, masseter 5/5   CN VII: Facial muscles symmetric and strong   CN VIII: Hears finger rub well bilaterally, intact vestibular function   CN IX/X: Normal palatal movement   CN XI: Full strength shoulder shrug bilaterally   CN XII: Tongue protrusion full and midline without fasciculation or atrophy  Motor: Normal tone and muscle bulk with no pronator drift.    Individual muscle group testing:  Shoulder abduction:   Left:5/5   Right : 5/5    Shoulder adduction:   Left:5/5   Right : 5/5    Elbow flexion:      Left:5/5   Right : 5/5  Elbow extension:    Left:5/5   Right : 5/5   Wrist flexion:    Left:5/5   Right : 5/5  Wrist extension:    Left:5/5   Right : 5/5  Arm pronation:   Left:5/5   Right : 5/5  Arm supination:   Left:5/5   Right : 5/5    Finger flexion:    Left:5/5   Right : 5/5    Finger extension:   Left:5/5   Right : 5/5   Finger abduction:  Left:5/5   Right : 5/5   Finger adduction:   Left:5/5   Right : 5/5  Hip flexion: Left:5/5   Right : 5/5         Hip extension:   Left:5/5   Right : 5/5    Knee flexion:    Left:5/5   Right : 5/5    Knee extension:   Left:5/5   Right : 5/5    Dorsiflexion:     Left:5/5   Right : 5/5  Plantar flexion:    Left:5/5   Right : 5/5      MSRs: No crossed adductors or clonus. RIGHT  LEFT   Brachioradialis 3+ 3+   Biceps 3+ 3+   Triceps 3+ 3+   Knee 3+ 3+   Achilles 3+ 3+        Plantar response Downward Downward          Sensation: Normal and symmetric perception of pinprick, temperature, light touch, proprioception, and vibration; Coordination: No dysmetria. Normal rapid alternating movements; finger-to-nose and heel-to- shin testing are within normal limits. Gait: Normal native gait. PERTINENT DATA:  INTERNAL RECORDS:  The patient's electronic medical record was reviewed. The relevant details include:     CT Results (maximum last 3): Results from [place] encounter on 02/04/23    CT HEAD WO CONT    Narrative  EXAM:  CT HEAD WO CONT    INDICATION:   numbness, tingling    COMPARISON: None. TECHNIQUE: Unenhanced CT of the head was performed using 5 mm images. Brain and  bone windows were generated. CT dose reduction was achieved through use of a  standardized protocol tailored for this examination and automatic exposure  control for dose modulation. FINDINGS:  The ventricles are normal in size and position. Basilar cisterns are patent. No  midline shift. There is no evidence of acute infarct, hemorrhage, or extraaxial  fluid collection. The paranasal sinuses, mastoid air cells, and middle ears are clear. The orbital  contents are within normal limits. There are no significant osseous or  extracranial soft tissue lesions. Impression  1. No evidence of acute intracranial abnormality. ASSESSMENT:      ICD-10-CM ICD-9-CM    1.  Paresthesia  R20.2 782.0 HEMOGLOBIN A1C WITH EAG      VITAMIN B12      TSH 3RD GENERATION      CHEN, DIRECT, W/REFLEX      MRI BRAIN W WO CONT      HEMOGLOBIN A1C WITH EAG      VITAMIN B12      TSH 3RD GENERATION      CHEN, DIRECT, W/REFLEX      2. Hyperreflexia  R29.2 796.1 MRI BRAIN W WO CONT      3. Migraine with aura and without status migrainosus, not intractable  G43.109 346.00 MRI BRAIN W WO CONT      32year old female with a h/o migraines presenting with subacute generalized numbness/paresthesias involving the R>L extremities x 1 month. She recalls taking a THC gummy just prior to onset of symptoms. She has also noted increased frequency of migraines as well as visual aura x 3 months with frequent flashing lights. Above symptoms have triggered significant anxiety as well for which she was recently started on Lexapro. Neurological examination appears non-focal today apart from diffuse relative hyperreflexia. No indication of a generalized polyneuropathy despite family history significant for CMT. Head CT was reviewed from 2/4/23 without acute intracranial pathology. Will proceed with more detailed MRI Brain to exclude demyelinating lesions contributing to diffuse paresthesias and hyperreflexia. Laboratory investigations will also be completed to exclude any reversible metabolic derangements which may mimic above presentation. Lastly, in the event additional investigations are unrevealing, we discussed the possibility of migraine variant contributing to her paresthesias and recent visual auras. Will transition from Lexapro to Nortriptyline to assist with migraine prophylaxis. Reviewed potential for sedation, dry eyes/mouth, constipation, cardiac conduction abnormalities. She was encouraged to maintain a diary of symptoms to determine response to above treatment. PLAN:  HbA1C, B12, TSH, CHEN w/reflex  MRI Brain WWO  D/C Lexapro, start Nortriptyline 25mg qhs for migraine prophylaxis    Follow-up and Dispositions    Return in about 2 months (around 4/13/2023).          I have discussed the diagnosis with the patient today and the intended plan as seen in the above orders with both the patient as well as referring provider and/or PCP via electronic correspondence. The patient has received an after-visit summary and questions were answered concerning future plans. I have discussed medication side effects and warnings with the patient as well. Rachael Avendaño, DO  Staff Neurologist  Diplomate, [facility] Board of Psychiatry & Neurology

## 2023-02-13 NOTE — LETTER
2/13/2023    Patient: Delores Phan   YOB: 1995   Date of Visit: 2/13/2023     Jessi Bustamante MD  [address] [address]  Via In Basket    Dear Jessi Bustamante MD,      Thank you for referring Ms. Delores Phan to [address] [place] [address] for evaluation. My notes for this consultation are attached. If you have questions, please do not hesitate to call me. I look forward to following your patient along with you.       Sincerely,    Sukumar Hathaway, DO

## 2023-02-14 LAB
ANA SER QL: NEGATIVE
EST. AVERAGE GLUCOSE BLD GHB EST-MCNC: 111 MG/DL
HBA1C MFR BLD: 5.5 % (ref 4.8–5.6)
TSH SERPL DL<=0.005 MIU/L-ACNC: 1.14 UIU/ML (ref 0.45–4.5)
VIT B12 SERPL-MCNC: 430 PG/ML (ref 232–1245)

## 2023-02-16 ENCOUNTER — TELEPHONE (OUTPATIENT)
Dept: NEUROLOGY | Age: 28
End: 2023-02-16

## 2023-02-16 NOTE — TELEPHONE ENCOUNTER
Call placed to patient. 2 identifiers received. Advised per Dr. Tyrone Ledezma that lab results were without significant abnormalities. Patient indicatead understanding.

## 2023-02-17 ENCOUNTER — OFFICE VISIT (OUTPATIENT)
Dept: FAMILY MEDICINE CLINIC | Age: 28
End: 2023-02-17
Payer: COMMERCIAL

## 2023-02-17 VITALS
DIASTOLIC BLOOD PRESSURE: 60 MMHG | HEIGHT: 67 IN | WEIGHT: 144.6 LBS | RESPIRATION RATE: 18 BRPM | OXYGEN SATURATION: 99 % | HEART RATE: 97 BPM | SYSTOLIC BLOOD PRESSURE: 96 MMHG | BODY MASS INDEX: 22.7 KG/M2 | TEMPERATURE: 97.3 F

## 2023-02-17 DIAGNOSIS — G56.32 RADIAL NERVE COMPRESSION, LEFT: Primary | ICD-10-CM

## 2023-02-17 DIAGNOSIS — F41.8 ANXIETY ABOUT HEALTH: ICD-10-CM

## 2023-02-17 DIAGNOSIS — R20.2 PARESTHESIA: ICD-10-CM

## 2023-02-17 PROCEDURE — 99214 OFFICE O/P EST MOD 30 MIN: CPT | Performed by: STUDENT IN AN ORGANIZED HEALTH CARE EDUCATION/TRAINING PROGRAM

## 2023-02-17 NOTE — PROGRESS NOTES
Shannan Carvalho  32 y.o. female  1995  [address] [facility] [address]  [id number]     [facility]       Chief Complaint: ongoing neurologic symptoms   Source: self, review of neurology records and labwork     Subjective  Shannan Carvalho is an 32 y.o. female who presents for ongoing neurological symptoms and including worsening parathesias. Fingers 3,4,5 in the left hand and toes go numb intermittently and feels like her vision and hearing are off. The fingers in her hand are mostly numb upon waking in the morning, the sensation comes back into her hand after a little while but will recur throughout the day. 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 Overall she feels like symptoms are progressing. States here feet will be freezing, then burning then feeling numb. This occurs at least once daily up to several times throughout the same. Not associated with any positional changes such as sitting on her legs or crossing the legs. Currently states her right foot feels numb \"like I can't even feel my shoe is on\". Also recalls several episodes of feeling like her tongue was numb and being unable to speak. She has related all symptoms back to taking CBD gummy about 1 month ago however in thinking back states that in the weeks before that event she was having more migraine headaches with aura - described as \"sparkles in my vision\" and was occasionally dropping her toothbrush inadvertently when brushing her teeth. She states her heated blanket no longer feels as warm like her general sense of touch is decreased. Also can no longer feel her phone when it vibrates. Usually keeps phoe in her right pocket but has placed it over the torso and cannot feel the vibrations.     Having diarrhea at times and waking up overnight drenched overnight though she is sleeping better on the amytriptyline; previously was taking ativan to get to sleep as was unable to fall asleep prior to the amytriptyline. She has an appointment scheduled with a therapist - Darlene Springer LCSW with [address]. Sister is bipolar, mom is not \"a non-functional human - she is a drug addict\". No drug use during pregnancy with her. Father with Radha Bales. As far as she knows there is no family history of MS nor other demyelinating disease in her family. She does have a remote history of lyme disease - 2009 - without residual symptoms. Her anxiety over the situation has worsened. Allergies - reviewed: Allergies   Allergen Reactions    Hydrocodone Nausea and Vomiting         Medications - reviewed:   Current Outpatient Medications   Medication Sig    nortriptyline (PAMELOR) 25 mg capsule Take 1 Capsule by mouth nightly. Sai, 28, 3-0.02 mg tab Take 1 Tablet by mouth daily. ascorbic acid, vitamin C, (VITAMIN C) 250 mg tablet Take  by mouth. Biotin 2,500 mcg cap Take  by mouth.    multivitamin (ONE A DAY) tablet Take 1 Tab by mouth daily. No current facility-administered medications for this visit. 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 No pertinent surgical history.       Social History - reviewed:  Social History     Socioeconomic History    Marital status:      Spouse name: Not on file    Number of children: Not on file    Years of education: Not on file    Highest education level: Not on file   Occupational History    Not on file   Tobacco Use    Smoking status: Never     Passive exposure: Past    Smokeless tobacco: Never   Vaping Use    Vaping Use: Never used   Substance and Sexual Activity    Alcohol use: Not Currently    Drug use: Yes     Types: Marijuana     Comment: took one gummy one month ago    Sexual activity: Yes     Partners: Male   Other Topics Concern    Not on file   Social History Narrative    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     Exercise: 3-4 days per week. Ashly Cuevas. Social Determinants of Health     Financial Resource Strain: Low Risk     Difficulty of Paying Living Expenses: Not hard at all   Food Insecurity: No Food Insecurity    Worried About Running Out of Food in the Last Year: Never true    Ran Out of Food in the Last Year: Never true   Transportation Needs: Not on file   Physical Activity: Not on file   Stress: Not on file   Social Connections: Not on file   Intimate Partner Violence: Not on file   Housing Stability: Not on file         Family History - reviewed:  Family History   Problem Relation Age of Onset    Diabetes Mother     Hypertension Mother     High Cholesterol Father     Charcot-Amarilys-Tooth disease Father     No Known Problems Sister     Breast Cancer Maternal Grandmother     Alzheimer's Disease Paternal Grandmother     Alzheimer's Disease Paternal Grandfather          Immunizations - reviewed:   Immunization History   Administered Date(s) Administered    Influenza Vaccine 10/29/2019    Influenza, FLUARIX, FLULAVAL, FLUZONE (age 10 mo+) AND AFLURIA, (age 1 y+), PF, 0.5mL 10/08/2019    Tdap 07/22/2014         Review of Systems   Constitutional:  Positive for diaphoresis. Negative for chills and fever. Cardiovascular:  Negative for chest pain and palpitations. Neurological:  Positive for sensory change. Negative for focal weakness and weakness. Psychiatric/Behavioral:  Negative for depression, hallucinations, substance abuse and suicidal ideas. The patient is nervous/anxious.         Physical Exam  Visit Vitals  BP 96/60 (BP 1 Location: Left upper arm, BP Patient Position: Sitting, BP Cuff Size: Adult)   Pulse 97   Temp 97.3 °F (36.3 °C) (Temporal)   Resp 18   Ht 5' 7\" (1.702 m)   Wt 144 lb 9.6 oz (65.6 kg)   LMP 01/31/2023 (Approximate)   SpO2 99%   BMI 22.65 kg/m²       Physical Exam  Vitals and nursing note reviewed. Constitutional:       General: She is not in acute distress. Appearance: Normal appearance. She is normal weight. She is not ill-appearing, toxic-appearing or diaphoretic. Comments: Very anxious   Eyes:      Extraocular Movements: Extraocular movements intact. Cardiovascular:      Pulses:           Dorsalis pedis pulses are 2+ on the right side and 2+ on the left side. Posterior tibial pulses are 2+ on the right side and 2+ on the left side. Musculoskeletal:         General: Normal range of motion. Right lower leg: No edema. Left lower leg: No edema. Feet:      Right foot:      Protective Sensation: 6 sites tested. 6 sites sensed. Left foot:      Protective Sensation: 6 sites tested. 6 sites sensed. Comments: Feet are cool to touch, left foot mildly erythematous over forefoot. Intact but decreased vibratory sensation in the right foot, intact in left. Ligtht touch diminished in the right foot, intact in left. +  Neurological:      General: No focal deficit present. Mental Status: She is alert and oriented to person, place, and time. Cranial Nerves: No cranial nerve deficit. Gait: Gait normal.   Psychiatric:         Attention and Perception: Attention and perception normal.         Mood and Affect: Mood is anxious. Affect is tearful. Speech: Speech normal.         Behavior: Behavior normal. Behavior is not agitated, slowed, aggressive or withdrawn. Behavior is cooperative. Thought Content: Thought content normal.         Cognition and Memory: Cognition and memory normal.       Assessment/Plan    ICD-10-CM ICD-9-CM    1. Radial nerve compression, left  G56.32 354.3       2. Anxiety about health  F41.8 300.09       3.  Paresthesia  R20.2 782.0           Frank Kennedy is an 32 y.o. female with new onset, nonlocalizing neurologic symptoms over the last month following ingestion if CBD gummy. Initial concern for slow metabolization as having out of body type experience for nearly two weeks and subsequently has developed random paresthesias without clear localizing source. She does have a prior history of migraine and today states these seemed to have worsened since prior to the CBD ingestion. She is seeing neurology and has MRI scheduled for next week to look for evidence of demyelination or other intracranial pathology. Discussed with her frankly that there is concern for a true neurologic problem but that the symptoms may also represent a new onset psychiatric disorder. She is seeing a therapist next week to work on processing emotions related to this and who may have further insight. I do think if the neurologic workup is unrevealing she should see psychiatry for thorough behavioral interview. She has expressed concerns for neurologic poisoning - can reach out to poison control/toxicology to discuss but I'm not sure much will come of this. 1. Radial nerve compression, left: likely compression at wrist and/or elbow overnight . +/- related to other symtptoms  - advised wrist brace for overnight and stretches at ulnar and carpal tunnel    2. Anxiety about health  - folllowup with Aubrey Baker LCSW  - continue amytriptylline    3. Paresthesia  - followup with neurology, MRI scheduled for within the week    Patient informed to follow up: Follow-up and Dispositions    Return if symptoms worsen or fail to improve. I have discussed the diagnosis with the patient and the intended plan as seen in the above orders. Patient verbalized understanding of the plan and agrees with the plan. The patient has received an after-visit summary and questions were answered concerning future plans. I have discussed medication side effects and warnings with the patient as well. Informed patient to return to the office if new symptoms arise.       Tonya Jha, MD  [address] [place] [address]

## 2023-02-17 NOTE — PROGRESS NOTES
No chief complaint on file. 1. \"Have you been to the ER, urgent care clinic since your last visit? Hospitalized since your last visit? \" Yes [facility] 2/19/23 DX: right leg completely numb     2. \"Have you seen or consulted any other health care providers outside of the [address] [place] [address] [place] since your last visit? \" No     3. For patients aged 39-70: Has the patient had a colonoscopy / FIT/ Cologuard? NA - based on age      If the patient is female:    4. For patients aged 41-77: Has the patient had a mammogram within the past 2 years? NA - based on age or sex      11. For patients aged 21-65: Has the patient had a pap smear? Yes - Care Gap present.  Rooming MA/LPN to request most recent results         3 most recent PHQ Screens 2/13/2023   PHQ Not Done -   Little interest or pleasure in doing things Not at all   Feeling down, depressed, irritable, or hopeless Not at all   Total Score PHQ 2 0   Trouble falling or staying asleep, or sleeping too much -   Feeling tired or having little energy -   Poor appetite, weight loss, or overeating -   Feeling bad about yourself - or that you are a failure or have let yourself or your family down -   Trouble concentrating on things such as school, work, reading, or watching TV -   Moving or speaking so slowly that other people could have noticed; or the opposite being so fidgety that others notice -   Thoughts of being better off dead, or hurting yourself in some way -   PHQ 9 Score -   How difficult have these problems made it for you to do your work, take care of your home and get along with others -       Health Maintenance Due   Topic Date Due    COVID-19 Vaccine (1) Never done    Pap Smear  06/25/2021    Flu Vaccine (1) 08/01/2022

## 2023-02-17 NOTE — Clinical Note
Appreciate you seeing Mrs Gurjit Bueno. Just routing note as took thorough notes on our discussion. I had only met her once prior to these symptoms coming on so have no baseline nor insight from psychological point of view though if your workup is unrevealing certainly think could be contributing. Appreciate you insights and time with her. Thanks.

## 2023-02-23 ENCOUNTER — HOSPITAL ENCOUNTER (OUTPATIENT)
Dept: MRI IMAGING | Age: 28
Discharge: HOME | End: 2023-02-23
Attending: PSYCHIATRY & NEUROLOGY
Payer: COMMERCIAL

## 2023-02-23 VITALS — BODY MASS INDEX: 23.49 KG/M2 | WEIGHT: 150 LBS

## 2023-02-23 DIAGNOSIS — G43.109 MIGRAINE WITH AURA AND WITHOUT STATUS MIGRAINOSUS, NOT INTRACTABLE: ICD-10-CM

## 2023-02-23 DIAGNOSIS — R29.2 HYPERREFLEXIA: ICD-10-CM

## 2023-02-23 DIAGNOSIS — R20.2 PARESTHESIA: ICD-10-CM

## 2023-02-23 PROCEDURE — 74011250636 HC RX REV CODE- 250/636: Performed by: STUDENT IN AN ORGANIZED HEALTH CARE EDUCATION/TRAINING PROGRAM

## 2023-02-23 PROCEDURE — 70553 MRI BRAIN STEM W/O & W/DYE: CPT

## 2023-02-23 PROCEDURE — A9576 INJ PROHANCE MULTIPACK: HCPCS | Performed by: STUDENT IN AN ORGANIZED HEALTH CARE EDUCATION/TRAINING PROGRAM

## 2023-02-23 RX ADMIN — GADOTERIDOL 14 ML: 279.3 INJECTION, SOLUTION INTRAVENOUS at 18:14

## 2023-02-24 ENCOUNTER — APPOINTMENT (OUTPATIENT)
Dept: MRI IMAGING | Age: 28
End: 2023-02-24
Attending: PSYCHIATRY & NEUROLOGY

## 2023-02-27 ENCOUNTER — TELEPHONE (OUTPATIENT)
Dept: NEUROLOGY | Age: 28
End: 2023-02-27

## 2023-02-27 NOTE — TELEPHONE ENCOUNTER
Called and spoke to the Pt. Per Dr. Zoran Tena:  MRI Brain does not reveal evidence of any acute pathology    The Pt would like to notify the Dr. The numbness has changed it was mostly in the rt foot now it is mostly in the Left foot. Wants to ask the Dr. If this could be Toxic Neuropathy she is concerned something was in the gummy that she took.

## 2023-02-28 NOTE — TELEPHONE ENCOUNTER
Called and spoke to the Pt. She would like to proceed with the EMG testing. She also stated that the muscle weakness is getting worse. Per Dr. Ochoa Lorenz: We can proceed with an EMG to assess for neuropathy since her MRI was unrevealing, although exam did not disclose clinical features that would support this.  If she elects to proceed, I will place the order

## 2023-03-01 ENCOUNTER — DOCUMENTATION ONLY (OUTPATIENT)
Dept: NEUROLOGY | Age: 28
End: 2023-03-01

## 2023-03-01 ENCOUNTER — OFFICE VISIT (OUTPATIENT)
Dept: NEUROLOGY | Age: 28
End: 2023-03-01
Payer: COMMERCIAL

## 2023-03-01 DIAGNOSIS — R20.2 PARESTHESIA OF BOTH HANDS: ICD-10-CM

## 2023-03-01 DIAGNOSIS — R20.2 PARESTHESIA OF BOTH FEET: Primary | ICD-10-CM

## 2023-03-01 DIAGNOSIS — R20.2 PARESTHESIA OF BILATERAL LEGS: Primary | ICD-10-CM

## 2023-03-01 PROCEDURE — 95886 MUSC TEST DONE W/N TEST COMP: CPT | Performed by: PSYCHIATRY & NEUROLOGY

## 2023-03-01 PROCEDURE — 95913 NRV CNDJ TEST 13/> STUDIES: CPT | Performed by: PSYCHIATRY & NEUROLOGY

## 2023-03-01 NOTE — PROGRESS NOTES
[address] [facility]  [address], [place] [facility] [facility] [address]  Phone [phone] Fax [phone]  Test Date:  3/1/2023    Patient: Miri Celeste : 1995 Physician: Stephy Zamora. Low Cintron DO   Sex: Female Height: 5' 7\" Ref Phys: Stephy Zamora. Low Cintron DO   ID#: [id number]  Weight: 150 lbs. Technician: Юляи Koroma     Patient Complaints:  Bilateral lower and upper extremity paresthesias    NCV & EMG Findings:  All nerve conduction studies were within normal limits. All left vs. right side differences were within normal limits. All F Wave latencies were within normal limits. All F Wave left vs. right side latency differences were within normal limits. All H Reflex left vs. right side latency differences were within normal limits. All examined muscles (as indicated in the following table) showed no evidence of electrical instability. Impression:  Extensive electrodiagnostic examination of the right lower and upper extremities with additional nerve conduction studies of the left upper and lower extremities reveals no abnormalities. In particular, there is no evidence of a right cervical or lumbosacral motor radiculopathy. In addition, there is no evidence of a generalized large-fiber sensorimotor polyneuropathy. ___________________________  Ramos Cintron DO        Nerve Conduction Studies  Anti Sensory Summary Table     Stim Site NR Peak (ms) Norm Peak (ms) P-T Amp (µV) Norm P-T Amp Onset (ms) Site1 Site2 Delta-P (ms) Dist (cm) Jose (m/s) Norm Jose (m/s)   Left Median Anti Sensory (2nd Digit)  33.1°C   Wrist    2.8 <3.6 31.0 >10 2.0 Wrist 2nd Digit 2.8 14.0 50 >39   Right Median Anti Sensory (2nd Digit)  32°C   Wrist    2.8 <3.6 55.0 >10 2.0 Wrist 2nd Digit 2.8 14.0 50 >39   Left Radial Anti Sensory (Base 1st Digit)  33°C   Wrist    1.7 <3.1 90.1  1.2 Wrist Base 1st Digit 1.7 0.0     Right Radial Anti Sensory [facility] 1st Digit)  32°C   Wrist    1.8 <3.1 81.6  1.4 Wrist Base 1st Digit 1.8 0.0     Left Sup Peroneal Anti Sensory (Ant Lat Mall)  33.1°C   14 cm    1.9 <4.4 23.1 >5.0 1.3 14 cm Ant Lat Mall 1.9 14.0 74 >32   Right Sup Peroneal Anti Sensory (Ant Lat Mall)  32.3°C   14 cm    1.9 <4.4 18.0 >5.0 1.4 14 cm Ant Lat Mall 1.9 14.0 74 >32   Left Sural Anti Sensory (Lat Mall)  33°C   Calf    3.2 <4.0 29.0 >5.0 2.7 Calf Lat Mall 3.2 14.0 44 >35   Right Sural Anti Sensory (Lat Mall)  32.4°C   Calf    3.4 <4.0 36.9 >5.0 2.8 Calf Lat Mall 3.4 14.0 41 >35   Left Ulnar Anti Sensory (5th Digit)  33.1°C   Wrist    2.5 <3.7 37.7 >15.0 1.9 Wrist 5th Digit 2.5 14.0 56 >38   Right Ulnar Anti Sensory (5th Digit)  32°C   Wrist    2.7 <3.7 45.6 >15.0 2.3 Wrist 5th Digit 2.7 14.0 52 >38     Motor Summary Table     Stim Site NR Onset (ms) Norm Onset (ms) O-P Amp (mV) Norm O-P Amp Site1 Site2 Delta-0 (ms) Dist (cm) Jose (m/s) Norm Jose (m/s)   Left Median Motor (Abd Poll Brev)  33°C   Wrist    2.3 <4.2 9.2 >5 Elbow Wrist 4.2 27.0 64 >50   Elbow    6.5  9.0          Right Median Motor (Abd Poll Brev)  32.3°C   Wrist    2.8 <4.2 9.7 >5 Elbow Wrist 4.4 27.0 61 >50   Elbow    7.2  9.4          Left Peroneal Motor (Ext Dig Brev)  31.1°C   Ankle    2.7 <6.1 6.0 >2.5 B Fib Ankle 6.4 36.0 56 >38   B Fib    9.1  5.3  Poplt B Fib 1.8 10.0 56 >40   Poplt    10.9  3.1          Right Peroneal Motor (Ext Dig Brev)  32.3°C   Ankle    2.9 <6.1 2.6 >2.5 B Fib Ankle 6.7 36.0 54 >38   B Fib    9.6  2.4  Poplt B Fib 1.7 10.0 59 >40   Poplt    11.3  2.4          Left Tibial Motor (Abd Schuler Brev)  31.6°C   Ankle    3.4 <6.1 12.7 >3.0 Knee Ankle 8.1 41.0 51 >35   Knee    11.5  9.5          Right Tibial Motor (Abd Schuler Brev)  32.4°C   Ankle    3.3 <6.1 12.7 >3.0 Knee Ankle 8.0 41.0 51 >35   Knee    11.3  11.8          Left Ulnar Motor (Abd Dig Minimi)  33.4°C   Wrist    2.2 <4.2 8.9 >3 B Elbow Wrist 2.5 17.0 68 >53   B Elbow    4.7  8.1  A Elbow B Elbow 1.5 10.0 67 >53 A Elbow    6.2  7.6          Right Ulnar Motor (Abd Dig Minimi)  33.1°C   Wrist    2.7 <4.2 12.8 >3 B Elbow Wrist 2.5 17.0 68 >53   B Elbow    5.2  12.1  A Elbow B Elbow 1.8 10.0 56 >53   A Elbow    7.0  12.1            Comparison Summary Table     Stim Site NR Peak (ms) Norm Peak (ms) P-T Amp (µV) Site1 Site2 Delta-P (ms) Norm Delta (ms)   Left Median/Ulnar Palm Comparison (Wrist - 8cm)  32.6°C   Median Palm    1.6 <2.5 47.4 Median Palm Ulnar Palm 0.3 <0.3   Ulnar Palm    1.3 <2.5 56.1       Right Median/Ulnar Palm Comparison (Wrist - 8cm)  32.6°C   Median Palm    1.6 <2.5 111.4 Median Palm Ulnar Palm 0.1 <0.3   Ulnar Palm    1.5 <2.5 33.5         F Wave Studies     NR F-Lat (ms) Lat Norm (ms) L-R F-Lat (ms) L-R Lat Norm   Left Tibial (Mrkrs) (Abd Hallucis)  32.1°C      46.60 <61 3.30 <5.7   Right Tibial (Mrkrs) (Abd Hallucis)  32.4°C      49.89 <61 3.30 <5.7   Left Ulnar (Mrkrs) (Abd Dig Min)  33.4°C      23.18 <36 1.18 <2.5   Right Ulnar (Mrkrs) (Abd Dig Min)  33.3°C      24.36 <36 1.18 <2.5     H Reflex Studies     NR H-Lat (ms) L-R H-Lat (ms) L-R Lat Norm   Left Tibial (Gastroc)  32.4°C      35.56 0.33 <2.0   Right Tibial (Gastroc)  32.6°C      35.23 0.33 <2.0     EMG     Side Muscle Nerve Root Ins Act Fibs Psw Amp Dur Poly Recrt Int Fidencio Chock Comment   Right Ext Dig Brev Dp Br Peronel L5, S1 Nml Nml Nml Nml Nml 0 Nml Nml    Right AbdHallucis MedPlantar S1-2 Nml Nml Nml Nml Nml 0 Nml Nml    Right PostTibialis Tibial L5, S1 Nml Nml Nml Nml Nml 0 Nml Nml    Right Gastroc Tibial S1-2 Nml Nml Nml Nml Nml 0 Nml Nml    Right AntTibialis Dp Br Peronel L4-5 Nml Nml Nml Nml Nml 0 Nml Nml    Right RectFemoris Femoral L2-4 Nml Nml Nml Nml Nml 0 Nml Nml    Right 1stDorInt Ulnar C8-T1 Nml Nml Nml Nml Nml 0 Nml Nml    Right FlexPolLong Median (Ant Int) C7-8 Nml Nml Nml Nml Nml 0 Nml Nml    Right Ext Indicis Radial (Post Int) C7-8 Nml Nml Nml Nml Nml 0 Nml Nml    Right Biceps Musculocut C5-6 Nml Nml Nml Nml Nml 0 Nml Nml    Right Triceps Radial C6-7-8 Nml Nml Nml Nml Nml 0 Nml Nml    Right Deltoid Axillary C5-6 Nml Nml Nml Nml Nml 0 Nml Nml          Waveforms:

## 2023-03-01 NOTE — PROGRESS NOTES
Pt called  crying stating her rt leg was in severe pain from her EMG today she had to  leave her job. Made sure she was not driving she stated she was at home sitting down. Encouraged her to have someone take her to urgent care. Per Dr. Berlin Castillo try to take Tylenol and if this does not help to seek urgent care. The Pt stated an understanding.

## 2023-05-19 RX ORDER — ASCORBIC ACID 250 MG
TABLET ORAL
COMMUNITY

## 2023-05-19 RX ORDER — NORTRIPTYLINE HYDROCHLORIDE 25 MG/1
1 CAPSULE ORAL NIGHTLY
COMMUNITY
Start: 2023-02-13 | End: 2023-06-23

## 2023-05-19 RX ORDER — DROSPIRENONE AND ETHINYL ESTRADIOL 0.02-3(28)
1 KIT ORAL DAILY
COMMUNITY
Start: 2022-04-03

## 2023-06-12 ENCOUNTER — TELEPHONE (OUTPATIENT)
Age: 28
End: 2023-06-12

## 2023-06-12 NOTE — TELEPHONE ENCOUNTER
Patient called wants to know if provider could call in something for her because [place] altitude is to high. No longer taking amitriptyline now taking zoloft.     Requesting a call back    Best call back # [phone]

## 2023-06-20 SDOH — ECONOMIC STABILITY: HOUSING INSECURITY
IN THE LAST 12 MONTHS, WAS THERE A TIME WHEN YOU DID NOT HAVE A STEADY PLACE TO SLEEP OR SLEPT IN A SHELTER (INCLUDING NOW)?: NO

## 2023-06-20 SDOH — ECONOMIC STABILITY: FOOD INSECURITY: WITHIN THE PAST 12 MONTHS, THE FOOD YOU BOUGHT JUST DIDN'T LAST AND YOU DIDN'T HAVE MONEY TO GET MORE.: NEVER TRUE

## 2023-06-20 SDOH — ECONOMIC STABILITY: TRANSPORTATION INSECURITY
IN THE PAST 12 MONTHS, HAS LACK OF TRANSPORTATION KEPT YOU FROM MEETINGS, WORK, OR FROM GETTING THINGS NEEDED FOR DAILY LIVING?: NO

## 2023-06-20 SDOH — ECONOMIC STABILITY: FOOD INSECURITY: WITHIN THE PAST 12 MONTHS, YOU WORRIED THAT YOUR FOOD WOULD RUN OUT BEFORE YOU GOT MONEY TO BUY MORE.: NEVER TRUE

## 2023-06-20 SDOH — ECONOMIC STABILITY: INCOME INSECURITY: HOW HARD IS IT FOR YOU TO PAY FOR THE VERY BASICS LIKE FOOD, HOUSING, MEDICAL CARE, AND HEATING?: NOT HARD AT ALL

## 2023-06-23 ENCOUNTER — TELEMEDICINE (OUTPATIENT)
Age: 28
End: 2023-06-23

## 2023-06-23 DIAGNOSIS — F44.7 FUNCTIONAL NEUROLOGICAL SYMPTOM DISORDER WITH MIXED SYMPTOMS: ICD-10-CM

## 2023-06-23 DIAGNOSIS — Z29.8 ALTITUDE SICKNESS PREVENTATIVE MEASURES: Primary | ICD-10-CM

## 2023-06-23 RX ORDER — ACETAZOLAMIDE 125 MG/1
TABLET ORAL
Qty: 12 TABLET | Refills: 0 | Status: SHIPPED | OUTPATIENT
Start: 2023-06-23

## 2023-06-23 SDOH — ECONOMIC STABILITY: FOOD INSECURITY: WITHIN THE PAST 12 MONTHS, THE FOOD YOU BOUGHT JUST DIDN'T LAST AND YOU DIDN'T HAVE MONEY TO GET MORE.: NEVER TRUE

## 2023-06-23 SDOH — ECONOMIC STABILITY: FOOD INSECURITY: WITHIN THE PAST 12 MONTHS, YOU WORRIED THAT YOUR FOOD WOULD RUN OUT BEFORE YOU GOT MONEY TO BUY MORE.: NEVER TRUE

## 2023-06-23 SDOH — ECONOMIC STABILITY: INCOME INSECURITY: HOW HARD IS IT FOR YOU TO PAY FOR THE VERY BASICS LIKE FOOD, HOUSING, MEDICAL CARE, AND HEATING?: NOT HARD AT ALL

## 2023-06-23 NOTE — PROGRESS NOTES
Elliot Henriquez  32 y.o. female  1995  [address] [place] [id number]  [id number]   [facility]  Telemedicine Progress Note  Jovan Birch MD       Encounter Date and Time: June 23, 2023 at 12:58 PM    Consent:  She and/or the health care decision maker is aware that that she may receive a bill for this telephone service, depending on her insurance coverage, and has provided verbal consent to proceed: YES    Chief Complaint   Patient presents with    Follow-up     Patient neurologist has dc'd the nortriptaline, pt is now on zoloft. 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 History of Present Illness   Elliot Henriquez is a 32 y.o. female was evaluated by synchronous (real-time) audio-video technology from home, through the [employer] Patient Portal.    Concern for altitude sickness  Will be going to [facility] in week has not been at this altitude in the past - going to [employer] and does not want to get sick. Will be there for 5 days     Functional Neuro Disorder   Diagnosed by different neurologist Cece Estrada- feeling much better now. Stopped nortrypylline, started on zoloft. Working with Marilee Jane at [facility] DR BIJAN PATRICIO. Next visit with neuro November 2023. Review of Systems   Negative except what is mentioned in HPI    Vitals/Objective:     General: alert, cooperative, and no distress   Mental  status: mental status: alert, oriented to person, place, and time, normal mood, behavior, speech, dress, motor activity, and thought processes, affect appropriate to mood   Resp: No increased WOB, speaking easily in complete sentences   Neuro: No focal deficits    Skin: skin: no discoloration or lesions of concern on visible areas   Due to this being a TeleHealth evaluation, many elements of the physical examination are unable to be assessed.       Assessment and Plan:   30yo F with FND being seen today for preparation for trip to [facility] for altitude sickness

## 2023-07-15 DIAGNOSIS — Z29.8 ALTITUDE SICKNESS PREVENTATIVE MEASURES: ICD-10-CM

## 2023-08-22 RX ORDER — ACETAZOLAMIDE 125 MG/1
TABLET ORAL
Qty: 12 TABLET | Refills: 0 | OUTPATIENT
Start: 2023-08-22

## 2023-11-07 DIAGNOSIS — G43.909 MIGRAINE, UNSPECIFIED, NOT INTRACTABLE, WITHOUT STATUS MIGRAINOSUS: ICD-10-CM

## 2023-11-07 RX ORDER — SUMATRIPTAN 100 MG/1
TABLET, FILM COATED ORAL
Qty: 9 TABLET | Refills: 3 | OUTPATIENT
Start: 2023-11-07

## 2023-11-07 NOTE — TELEPHONE ENCOUNTER
PCP: Anne-Marie Mayfield MD    Last appt: 6/23/2023     No future appointments.    Requested Prescriptions     Pending Prescriptions Disp Refills    SUMAtriptan (IMITREX) 100 MG tablet [Pharmacy Med Name: SUMATRIPTAN SUCC 100 MG TABLET] 9 tablet 3     Sig: TAKE 1 TABLET BY MOUTH ONCE AS NEEDED FOR MIGRAINE FOR UP TO 1 DOSE.       Prior labs and Blood pressures:  BP Readings from Last 3 Encounters:   02/17/23 96/60   02/13/23 118/80   02/11/23 125/81     Lab Results   Component Value Date/Time     02/10/2023 10:45 PM    K 3.9 02/10/2023 10:45 PM     02/10/2023 10:45 PM    CO2 26 02/10/2023 10:45 PM    BUN 9 02/10/2023 10:45 PM    GFRAA 129 01/24/2020 10:48 AM     No results found for: \"HBA1C\", \"[id number]\"  No results found for: \"CHOL\", \"CHOLPOCT\", \"CHOLX\", \"CHLST\", \"CHOLV\", \"HDL\", \"HDLPOC\", \"HDLC\", \"LDL\", \"LDLC\", \"VLDLC\", \"VLDL\", \"TGLX\", \"TRIGL\"  No results found for: \"VITD3\", \"VD3RIA\"    Lab Results   Component Value Date/Time    TSH 1.140 02/13/2023 02:40 PM

## 2023-11-08 NOTE — TELEPHONE ENCOUNTER
Call and spoke to patient, two ID confirmed. Patient stated that it was someone at our practice at least 2 years ago.   She asked for an appointment and she was schedule for Friday.

## 2023-11-10 ENCOUNTER — OFFICE VISIT (OUTPATIENT)
Age: 28
End: 2023-11-10
Payer: COMMERCIAL

## 2023-11-10 VITALS
WEIGHT: 165.4 LBS | RESPIRATION RATE: 18 BRPM | TEMPERATURE: 97.3 F | DIASTOLIC BLOOD PRESSURE: 80 MMHG | BODY MASS INDEX: 25.96 KG/M2 | HEIGHT: 67 IN | SYSTOLIC BLOOD PRESSURE: 112 MMHG | OXYGEN SATURATION: 99 % | HEART RATE: 78 BPM

## 2023-11-10 DIAGNOSIS — F32.A ANXIETY AND DEPRESSION: ICD-10-CM

## 2023-11-10 DIAGNOSIS — G43.109 MIGRAINE WITH AURA AND WITHOUT STATUS MIGRAINOSUS, NOT INTRACTABLE: Primary | ICD-10-CM

## 2023-11-10 DIAGNOSIS — F41.9 ANXIETY AND DEPRESSION: ICD-10-CM

## 2023-11-10 DIAGNOSIS — F44.7 FUNCTIONAL NEUROLOGICAL SYMPTOM DISORDER WITH MIXED SYMPTOMS: ICD-10-CM

## 2023-11-10 PROCEDURE — 99214 OFFICE O/P EST MOD 30 MIN: CPT | Performed by: STUDENT IN AN ORGANIZED HEALTH CARE EDUCATION/TRAINING PROGRAM

## 2023-11-10 RX ORDER — SUMATRIPTAN 50 MG/1
50 TABLET, FILM COATED ORAL DAILY PRN
Qty: 9 TABLET | Refills: 3 | Status: SHIPPED | OUTPATIENT
Start: 2023-11-10

## 2023-11-10 ASSESSMENT — ANXIETY QUESTIONNAIRES
5. BEING SO RESTLESS THAT IT IS HARD TO SIT STILL: 0
3. WORRYING TOO MUCH ABOUT DIFFERENT THINGS: 0
GAD7 TOTAL SCORE: 0
6. BECOMING EASILY ANNOYED OR IRRITABLE: 0
1. FEELING NERVOUS, ANXIOUS, OR ON EDGE: 0
2. NOT BEING ABLE TO STOP OR CONTROL WORRYING: 0
4. TROUBLE RELAXING: 0
IF YOU CHECKED OFF ANY PROBLEMS ON THIS QUESTIONNAIRE, HOW DIFFICULT HAVE THESE PROBLEMS MADE IT FOR YOU TO DO YOUR WORK, TAKE CARE OF THINGS AT HOME, OR GET ALONG WITH OTHER PEOPLE: NOT DIFFICULT AT ALL
7. FEELING AFRAID AS IF SOMETHING AWFUL MIGHT HAPPEN: 0

## 2023-11-10 ASSESSMENT — PATIENT HEALTH QUESTIONNAIRE - PHQ9
10. IF YOU CHECKED OFF ANY PROBLEMS, HOW DIFFICULT HAVE THESE PROBLEMS MADE IT FOR YOU TO DO YOUR WORK, TAKE CARE OF THINGS AT HOME, OR GET ALONG WITH OTHER PEOPLE: 0
SUM OF ALL RESPONSES TO PHQ9 QUESTIONS 1 & 2: 0
6. FEELING BAD ABOUT YOURSELF - OR THAT YOU ARE A FAILURE OR HAVE LET YOURSELF OR YOUR FAMILY DOWN: 0
8. MOVING OR SPEAKING SO SLOWLY THAT OTHER PEOPLE COULD HAVE NOTICED. OR THE OPPOSITE, BEING SO FIGETY OR RESTLESS THAT YOU HAVE BEEN MOVING AROUND A LOT MORE THAN USUAL: 0
1. LITTLE INTEREST OR PLEASURE IN DOING THINGS: 0
9. THOUGHTS THAT YOU WOULD BE BETTER OFF DEAD, OR OF HURTING YOURSELF: 0
3. TROUBLE FALLING OR STAYING ASLEEP: 0
4. FEELING TIRED OR HAVING LITTLE ENERGY: 0
SUM OF ALL RESPONSES TO PHQ QUESTIONS 1-9: 0
SUM OF ALL RESPONSES TO PHQ QUESTIONS 1-9: 0
2. FEELING DOWN, DEPRESSED OR HOPELESS: 0
SUM OF ALL RESPONSES TO PHQ QUESTIONS 1-9: 0
SUM OF ALL RESPONSES TO PHQ QUESTIONS 1-9: 0
5. POOR APPETITE OR OVEREATING: 0
7. TROUBLE CONCENTRATING ON THINGS, SUCH AS READING THE NEWSPAPER OR WATCHING TELEVISION: 0

## 2023-11-10 ASSESSMENT — COLUMBIA-SUICIDE SEVERITY RATING SCALE - C-SSRS
7. DID THIS OCCUR IN THE LAST THREE MONTHS: NO
4. HAVE YOU HAD THESE THOUGHTS AND HAD SOME INTENTION OF ACTING ON THEM?: NO
5. HAVE YOU STARTED TO WORK OUT OR WORKED OUT THE DETAILS OF HOW TO KILL YOURSELF? DO YOU INTEND TO CARRY OUT THIS PLAN?: NO
3. HAVE YOU BEEN THINKING ABOUT HOW YOU MIGHT KILL YOURSELF?: NO

## 2023-11-10 NOTE — PROGRESS NOTES
Maryann Gant  25 y.o. female  1995  [address] [id number]  [id number]     [facility]       Chief Complaint:  Chief Complaint   Patient presents with    Headache   Source: self, the medical record     Subjective  Maryann Gant is an 25 y.o. female who presents for concern for migraine HA. Gets them about 2x/month - she typically takes it for relief. No relationship to periods. Getting good sleep, well hydrated. Feels like they may be spurred by certain foods- may do allergy testing to determine what this is. Also breaks out occassionally on the face and thinks it may be certain foods. Has had a prescription for imitrex for a while    Visual snow syndrome with FNS - seeing neurop Myron Irby OD for visual disturbances. Also followed by [facility] - on zoloft would like to transfer this to our office once released from [facility] care    ROS  Negative except what is mentioned in HPI      Allergies - reviewed: Allergies   Allergen Reactions    Hydrocodone Nausea And Vomiting       Medications - reviewed:   Current Outpatient Medications   Medication Sig    MULTIPLE VITAMIN PO Take 1 tablet by mouth    SUMAtriptan (IMITREX) 50 MG tablet Take 1 tablet by mouth daily as needed for Migraine    sertraline (ZOLOFT) 50 MG tablet Take 1 tablet by mouth daily    ascorbic acid (VITAMIN C) 250 MG tablet Take by mouth    Biotin 2.5 MG CAPS Take by mouth    drospirenone-ethinyl estradiol (LITA) 3-0.02 MG per tablet Take 1 tablet by mouth daily     No current facility-administered medications for this visit. Past Medical History - reviewed:  Past Medical History:   Diagnosis Date    Anxiety     Functional neurological symptom disorder with mixed symptoms     HPV test positive     Low vitamin B12 level     Lyme disease 2009    no residual symptoms    Migraines     no headaches in several years         Past Surgical History - reviewed:   History reviewed.  No pertinent surgical regular

## 2024-01-19 ENCOUNTER — TELEPHONE (OUTPATIENT)
Age: 29
End: 2024-01-19

## 2024-01-19 NOTE — TELEPHONE ENCOUNTER
----- Message from Bala Lopez sent at 1/19/2024  9:14 AM EST -----  Subject: Message to Provider    QUESTIONS  Information for Provider? patient had to cancel appt with [facility]-she would   like to schedule a Friday near the end of September for an Annual Physical   with [facility]  ---------------------------------------------------------------------------  --------------  CALL BACK INFO  [phone]; OK to leave message on voicemail  ---------------------------------------------------------------------------  --------------  SCRIPT ANSWERS  Relationship to Patient? Self

## 2024-04-16 DIAGNOSIS — G43.109 MIGRAINE WITH AURA AND WITHOUT STATUS MIGRAINOSUS, NOT INTRACTABLE: ICD-10-CM

## 2024-04-16 RX ORDER — SUMATRIPTAN 50 MG/1
50 TABLET, FILM COATED ORAL DAILY PRN
Qty: 9 TABLET | Refills: 3 | Status: SHIPPED | OUTPATIENT
Start: 2024-04-16

## 2024-04-16 NOTE — TELEPHONE ENCOUNTER
PCP: Anne-Marie Mayfield MD    Last appt: 11/10/2023     Future Appointments   Date Time Provider Department Center   9/26/2024  8:40 AM Vicky Borges DO [facility]       Requested Prescriptions     Pending Prescriptions Disp Refills    SUMAtriptan (IMITREX) 50 MG tablet [Pharmacy Med Name: SUMATRIPTAN SUCC 50 MG TABLET] 9 tablet 3     Sig: TAKE 1 TABLET BY MOUTH DAILY AS NEEDED FOR MIGRAINE.       Prior labs and Blood pressures:  BP Readings from Last 3 Encounters:   11/10/23 112/80   02/17/23 96/60   02/13/23 118/80     Lab Results   Component Value Date/Time     02/10/2023 10:45 PM    K 3.9 02/10/2023 10:45 PM     02/10/2023 10:45 PM    CO2 26 02/10/2023 10:45 PM    BUN 9 02/10/2023 10:45 PM    GFRAA 129 01/24/2020 10:48 AM     No results found for: \"HBA1C\", \"GPL1TKQX\"  No results found for: \"CHOL\", \"CHOLPOCT\", \"CHOLX\", \"CHLST\", \"CHOLV\", \"HDL\", \"HDLPOC\", \"HDLC\", \"LDL\", \"LDLC\", \"VLDLC\", \"VLDL\", \"TGLX\", \"TRIGL\"  No results found for: \"VITD3\", \"VD3RIA\"    Lab Results   Component Value Date/Time    TSH 1.140 02/13/2023 02:40 PM

## 2024-10-21 SDOH — HEALTH STABILITY: PHYSICAL HEALTH: ON AVERAGE, HOW MANY DAYS PER WEEK DO YOU ENGAGE IN MODERATE TO STRENUOUS EXERCISE (LIKE A BRISK WALK)?: 5 DAYS

## 2024-10-21 SDOH — HEALTH STABILITY: PHYSICAL HEALTH: ON AVERAGE, HOW MANY MINUTES DO YOU ENGAGE IN EXERCISE AT THIS LEVEL?: 60 MIN

## 2024-10-24 ENCOUNTER — OFFICE VISIT (OUTPATIENT)
Age: 29
End: 2024-10-24

## 2024-10-24 VITALS
BODY MASS INDEX: 25.33 KG/M2 | SYSTOLIC BLOOD PRESSURE: 94 MMHG | HEART RATE: 81 BPM | TEMPERATURE: 98 F | HEIGHT: 67 IN | RESPIRATION RATE: 16 BRPM | DIASTOLIC BLOOD PRESSURE: 62 MMHG | WEIGHT: 161.4 LBS | OXYGEN SATURATION: 99 %

## 2024-10-24 DIAGNOSIS — Z01.84 IMMUNITY STATUS TESTING: ICD-10-CM

## 2024-10-24 DIAGNOSIS — G43.109 MIGRAINE WITH AURA AND WITHOUT STATUS MIGRAINOSUS, NOT INTRACTABLE: Primary | ICD-10-CM

## 2024-10-24 DIAGNOSIS — Z00.00 ANNUAL PHYSICAL EXAM: ICD-10-CM

## 2024-10-24 DIAGNOSIS — F32.A ANXIETY AND DEPRESSION: ICD-10-CM

## 2024-10-24 DIAGNOSIS — F41.9 ANXIETY AND DEPRESSION: ICD-10-CM

## 2024-10-24 LAB
ALBUMIN SERPL-MCNC: 4 G/DL (ref 3.5–5)
ALBUMIN/GLOB SERPL: 1.3 (ref 1.1–2.2)
ALP SERPL-CCNC: 59 U/L (ref 45–117)
ALT SERPL-CCNC: 18 U/L (ref 12–78)
ANION GAP SERPL CALC-SCNC: 4 MMOL/L (ref 2–12)
AST SERPL-CCNC: 12 U/L (ref 15–37)
BILIRUB SERPL-MCNC: 0.6 MG/DL (ref 0.2–1)
BUN SERPL-MCNC: 10 MG/DL (ref 6–20)
BUN/CREAT SERPL: 13 (ref 12–20)
CALCIUM SERPL-MCNC: 10 MG/DL (ref 8.5–10.1)
CHLORIDE SERPL-SCNC: 106 MMOL/L (ref 97–108)
CHOLEST SERPL-MCNC: 238 MG/DL
CO2 SERPL-SCNC: 29 MMOL/L (ref 21–32)
CREAT SERPL-MCNC: 0.76 MG/DL (ref 0.55–1.02)
ERYTHROCYTE [DISTWIDTH] IN BLOOD BY AUTOMATED COUNT: 12.1 % (ref 11.5–14.5)
GLOBULIN SER CALC-MCNC: 3.2 G/DL (ref 2–4)
GLUCOSE SERPL-MCNC: 98 MG/DL (ref 65–100)
HCT VFR BLD AUTO: 40.8 % (ref 35–47)
HDLC SERPL-MCNC: 93 MG/DL
HDLC SERPL: 2.6 (ref 0–5)
HGB BLD-MCNC: 13.7 G/DL (ref 11.5–16)
LDLC SERPL CALC-MCNC: 135.6 MG/DL (ref 0–100)
MCH RBC QN AUTO: 30.2 PG (ref 26–34)
MCHC RBC AUTO-ENTMCNC: 33.6 G/DL (ref 30–36.5)
MCV RBC AUTO: 90.1 FL (ref 80–99)
NRBC # BLD: 0 K/UL (ref 0–0.01)
NRBC BLD-RTO: 0 PER 100 WBC
PLATELET # BLD AUTO: 319 K/UL (ref 150–400)
PMV BLD AUTO: 9.2 FL (ref 8.9–12.9)
POTASSIUM SERPL-SCNC: 4.1 MMOL/L (ref 3.5–5.1)
PROT SERPL-MCNC: 7.2 G/DL (ref 6.4–8.2)
RBC # BLD AUTO: 4.53 M/UL (ref 3.8–5.2)
SODIUM SERPL-SCNC: 139 MMOL/L (ref 136–145)
TRIGL SERPL-MCNC: 47 MG/DL
VLDLC SERPL CALC-MCNC: 9.4 MG/DL
WBC # BLD AUTO: 5.5 K/UL (ref 3.6–11)

## 2024-10-24 RX ORDER — SUMATRIPTAN 100 MG/1
100 TABLET, FILM COATED ORAL DAILY PRN
Qty: 30 TABLET | Refills: 5 | Status: SHIPPED | OUTPATIENT
Start: 2024-10-24

## 2024-10-24 SDOH — ECONOMIC STABILITY: FOOD INSECURITY: WITHIN THE PAST 12 MONTHS, THE FOOD YOU BOUGHT JUST DIDN'T LAST AND YOU DIDN'T HAVE MONEY TO GET MORE.: NEVER TRUE

## 2024-10-24 SDOH — ECONOMIC STABILITY: FOOD INSECURITY: WITHIN THE PAST 12 MONTHS, YOU WORRIED THAT YOUR FOOD WOULD RUN OUT BEFORE YOU GOT MONEY TO BUY MORE.: NEVER TRUE

## 2024-10-24 SDOH — ECONOMIC STABILITY: INCOME INSECURITY: HOW HARD IS IT FOR YOU TO PAY FOR THE VERY BASICS LIKE FOOD, HOUSING, MEDICAL CARE, AND HEATING?: NOT HARD AT ALL

## 2024-10-24 ASSESSMENT — PATIENT HEALTH QUESTIONNAIRE - PHQ9
SUM OF ALL RESPONSES TO PHQ QUESTIONS 1-9: 0
8. MOVING OR SPEAKING SO SLOWLY THAT OTHER PEOPLE COULD HAVE NOTICED. OR THE OPPOSITE, BEING SO FIGETY OR RESTLESS THAT YOU HAVE BEEN MOVING AROUND A LOT MORE THAN USUAL: NOT AT ALL
3. TROUBLE FALLING OR STAYING ASLEEP: NOT AT ALL
SUM OF ALL RESPONSES TO PHQ QUESTIONS 1-9: 0
7. TROUBLE CONCENTRATING ON THINGS, SUCH AS READING THE NEWSPAPER OR WATCHING TELEVISION: NOT AT ALL
2. FEELING DOWN, DEPRESSED OR HOPELESS: NOT AT ALL
4. FEELING TIRED OR HAVING LITTLE ENERGY: NOT AT ALL
SUM OF ALL RESPONSES TO PHQ QUESTIONS 1-9: 0
1. LITTLE INTEREST OR PLEASURE IN DOING THINGS: NOT AT ALL
SUM OF ALL RESPONSES TO PHQ9 QUESTIONS 1 & 2: 0
SUM OF ALL RESPONSES TO PHQ QUESTIONS 1-9: 0
6. FEELING BAD ABOUT YOURSELF - OR THAT YOU ARE A FAILURE OR HAVE LET YOURSELF OR YOUR FAMILY DOWN: NOT AT ALL
5. POOR APPETITE OR OVEREATING: NOT AT ALL
9. THOUGHTS THAT YOU WOULD BE BETTER OFF DEAD, OR OF HURTING YOURSELF: NOT AT ALL
10. IF YOU CHECKED OFF ANY PROBLEMS, HOW DIFFICULT HAVE THESE PROBLEMS MADE IT FOR YOU TO DO YOUR WORK, TAKE CARE OF THINGS AT HOME, OR GET ALONG WITH OTHER PEOPLE: NOT DIFFICULT AT ALL

## 2024-10-24 NOTE — PROGRESS NOTES
Assessment/Plan:     1. Migraine with aura and without status migrainosus, not intractable-chronic, unstable.  Will increase sumatriptan and follow-up as needed.  -     SUMAtriptan (IMITREX) 100 MG tablet; Take 1 tablet by mouth daily as needed for Migraine, Disp-30 tablet, R-5Normal  2. Anxiety and depression -chronic, stable.  Continue current treatment plan.  Overview:  Previously on lexapro but no medication at this time. Mother with significant drug abuse issues and father with ETOH abuse in remission.  Seeing his therapist monthly.    3. Annual physical exam Preventive health maintenance as well as care gaps reviewed with patient and addressed.  Appropriate labs and screenings ordered.  Appropriate vaccines discussed and recommended for patient. Patient also advised to include more fruits and vegetables in diet, avoiding concentrated sweets and processed foods and to exercise at least 30 minutes per day 5 times a week.   -     Lipid Panel; Future  -     Comprehensive Metabolic Panel; Future  -     CBC; Future  -     Varicella Zoster Antibody, IgG; Future       Follow up:     Return in about 1 year (around 10/24/2025) for Annual physical, Chronic conditions follow up.      I have reviewed patient medical and social history and medications.  I have reviewed pertinent labs results and other data. I have discussed the diagnosis with the patient and the intended plan as seen in the above orders. The patient has received an after-visit summary and questions were answered concerning future plans. I have discussed medication side effects and warnings with the patient as well.        Subjective:     Chief Complaint   Patient presents with    Established New Doctor     Was Dr. Mayfield's patient       HPI:  Haley Guardado is a 29 y.o. female that presents for: Chronic conditions follow-up, medication refill and establish with new PCP.  She also would like to get some blood work done get a general checkup.  She follows with

## 2024-10-24 NOTE — PROGRESS NOTES
Chief Complaint   Patient presents with    Established New Doctor     Was Dr. Mayfield's patient     \"Have you been to the ER, urgent care clinic since your last visit?  Hospitalized since your last visit?\"    NO    “Have you seen or consulted any other health care providers outside of [facility] since your last visit?”    NO     “Have you had a pap smear?”    YES - Where: [facility], 2023, Dr. Perry Nurse/CMA to request most recent records if not in the chart    Date of last Cervical Cancer screen (HPV or PAP): 9/3/2021             Click Here for Release of Records Request             11/10/2023     9:19 AM   PHQ-9    Little interest or pleasure in doing things 0   Feeling down, depressed, or hopeless 0   Trouble falling or staying asleep, or sleeping too much 0   Feeling tired or having little energy 0   Poor appetite or overeating 0   Feeling bad about yourself - or that you are a failure or have let yourself or your family down 0   Trouble concentrating on things, such as reading the newspaper or watching television 0   Moving or speaking so slowly that other people could have noticed. Or the opposite - being so fidgety or restless that you have been moving around a lot more than usual 0   Thoughts that you would be better off dead, or of hurting yourself in some way 0   PHQ-2 Score 0   PHQ-9 Total Score 0   If you checked off any problems, how difficult have these problems made it for you to do your work, take care of things at home, or get along with other people? 0           Financial Resource Strain: Low Risk  (6/23/2023)    Overall Financial Resource Strain ([facility])     Difficulty of Paying Living Expenses: Not hard at all      Food Insecurity: Not on file (6/23/2023)          Health Maintenance Due   Topic Date Due    Varicella vaccine (1 of 2 - 13+ 2-dose series) Never done    Hepatitis B vaccine (1 of 3 - 19+ 3-dose series) Never done    DTaP/Tdap/Td vaccine (2 - Td or Tdap) 07/22/2024    Flu

## 2024-10-26 LAB — VZV IGG SER IA-ACNC: NON REACTIVE
